# Patient Record
Sex: MALE | Race: WHITE | NOT HISPANIC OR LATINO | URBAN - METROPOLITAN AREA
[De-identification: names, ages, dates, MRNs, and addresses within clinical notes are randomized per-mention and may not be internally consistent; named-entity substitution may affect disease eponyms.]

---

## 2018-11-25 ENCOUNTER — INPATIENT (INPATIENT)
Facility: HOSPITAL | Age: 28
LOS: 10 days | Discharge: HOME | End: 2018-12-06
Attending: INTERNAL MEDICINE | Admitting: INTERNAL MEDICINE

## 2018-11-25 VITALS
TEMPERATURE: 98 F | OXYGEN SATURATION: 100 % | HEART RATE: 74 BPM | DIASTOLIC BLOOD PRESSURE: 56 MMHG | RESPIRATION RATE: 18 BRPM | SYSTOLIC BLOOD PRESSURE: 105 MMHG

## 2018-11-25 LAB
ALBUMIN SERPL ELPH-MCNC: 3.9 G/DL — SIGNIFICANT CHANGE UP (ref 3.5–5.2)
ALP SERPL-CCNC: 60 U/L — SIGNIFICANT CHANGE UP (ref 30–115)
ALT FLD-CCNC: 12 U/L — SIGNIFICANT CHANGE UP (ref 0–41)
ANION GAP SERPL CALC-SCNC: 12 MMOL/L — SIGNIFICANT CHANGE UP (ref 7–14)
APTT BLD: 36.1 SEC — SIGNIFICANT CHANGE UP (ref 27–39.2)
AST SERPL-CCNC: 17 U/L — SIGNIFICANT CHANGE UP (ref 0–41)
BASOPHILS # BLD AUTO: 0.02 K/UL — SIGNIFICANT CHANGE UP (ref 0–0.2)
BASOPHILS NFR BLD AUTO: 0.2 % — SIGNIFICANT CHANGE UP (ref 0–1)
BILIRUB SERPL-MCNC: 0.4 MG/DL — SIGNIFICANT CHANGE UP (ref 0.2–1.2)
BUN SERPL-MCNC: 16 MG/DL — SIGNIFICANT CHANGE UP (ref 10–20)
CALCIUM SERPL-MCNC: 9.4 MG/DL — SIGNIFICANT CHANGE UP (ref 8.5–10.1)
CHLORIDE SERPL-SCNC: 95 MMOL/L — LOW (ref 98–110)
CO2 SERPL-SCNC: 29 MMOL/L — SIGNIFICANT CHANGE UP (ref 17–32)
CREAT SERPL-MCNC: 1.1 MG/DL — SIGNIFICANT CHANGE UP (ref 0.7–1.5)
EOSINOPHIL # BLD AUTO: 0.03 K/UL — SIGNIFICANT CHANGE UP (ref 0–0.7)
EOSINOPHIL NFR BLD AUTO: 0.3 % — SIGNIFICANT CHANGE UP (ref 0–8)
GLUCOSE SERPL-MCNC: 98 MG/DL — SIGNIFICANT CHANGE UP (ref 70–99)
HCT VFR BLD CALC: 39.5 % — LOW (ref 42–52)
HGB BLD-MCNC: 13.1 G/DL — LOW (ref 14–18)
IMM GRANULOCYTES NFR BLD AUTO: 0.4 % — HIGH (ref 0.1–0.3)
INR BLD: 1.4 RATIO — HIGH (ref 0.65–1.3)
LYMPHOCYTES # BLD AUTO: 1.18 K/UL — LOW (ref 1.2–3.4)
LYMPHOCYTES # BLD AUTO: 10.4 % — LOW (ref 20.5–51.1)
MCHC RBC-ENTMCNC: 28.6 PG — SIGNIFICANT CHANGE UP (ref 27–31)
MCHC RBC-ENTMCNC: 33.2 G/DL — SIGNIFICANT CHANGE UP (ref 32–37)
MCV RBC AUTO: 86.2 FL — SIGNIFICANT CHANGE UP (ref 80–94)
MONOCYTES # BLD AUTO: 0.82 K/UL — HIGH (ref 0.1–0.6)
MONOCYTES NFR BLD AUTO: 7.3 % — SIGNIFICANT CHANGE UP (ref 1.7–9.3)
NEUTROPHILS # BLD AUTO: 9.2 K/UL — HIGH (ref 1.4–6.5)
NEUTROPHILS NFR BLD AUTO: 81.4 % — HIGH (ref 42.2–75.2)
NRBC # BLD: 0 /100 WBCS — SIGNIFICANT CHANGE UP (ref 0–0)
PLATELET # BLD AUTO: 197 K/UL — SIGNIFICANT CHANGE UP (ref 130–400)
POTASSIUM SERPL-MCNC: 4.7 MMOL/L — SIGNIFICANT CHANGE UP (ref 3.5–5)
POTASSIUM SERPL-SCNC: 4.7 MMOL/L — SIGNIFICANT CHANGE UP (ref 3.5–5)
PROT SERPL-MCNC: 7.3 G/DL — SIGNIFICANT CHANGE UP (ref 6–8)
PROTHROM AB SERPL-ACNC: 16.1 SEC — HIGH (ref 9.95–12.87)
RBC # BLD: 4.58 M/UL — LOW (ref 4.7–6.1)
RBC # FLD: 14.3 % — SIGNIFICANT CHANGE UP (ref 11.5–14.5)
SODIUM SERPL-SCNC: 136 MMOL/L — SIGNIFICANT CHANGE UP (ref 135–146)
WBC # BLD: 11.3 K/UL — HIGH (ref 4.8–10.8)
WBC # FLD AUTO: 11.3 K/UL — HIGH (ref 4.8–10.8)

## 2018-11-25 RX ORDER — PIPERACILLIN AND TAZOBACTAM 4; .5 G/20ML; G/20ML
4.5 INJECTION, POWDER, LYOPHILIZED, FOR SOLUTION INTRAVENOUS ONCE
Qty: 0 | Refills: 0 | Status: COMPLETED | OUTPATIENT
Start: 2018-11-25 | End: 2018-11-26

## 2018-11-25 RX ORDER — KETOROLAC TROMETHAMINE 30 MG/ML
15 SYRINGE (ML) INJECTION ONCE
Qty: 0 | Refills: 0 | Status: DISCONTINUED | OUTPATIENT
Start: 2018-11-25 | End: 2018-11-25

## 2018-11-25 RX ORDER — MORPHINE SULFATE 50 MG/1
6 CAPSULE, EXTENDED RELEASE ORAL ONCE
Qty: 0 | Refills: 0 | Status: DISCONTINUED | OUTPATIENT
Start: 2018-11-25 | End: 2018-11-25

## 2018-11-25 RX ORDER — VANCOMYCIN HCL 1 G
1000 VIAL (EA) INTRAVENOUS ONCE
Qty: 0 | Refills: 0 | Status: COMPLETED | OUTPATIENT
Start: 2018-11-25 | End: 2018-11-25

## 2018-11-25 RX ORDER — SODIUM CHLORIDE 9 MG/ML
1000 INJECTION INTRAMUSCULAR; INTRAVENOUS; SUBCUTANEOUS ONCE
Qty: 0 | Refills: 0 | Status: COMPLETED | OUTPATIENT
Start: 2018-11-25 | End: 2018-11-25

## 2018-11-25 RX ADMIN — SODIUM CHLORIDE 1000 MILLILITER(S): 9 INJECTION INTRAMUSCULAR; INTRAVENOUS; SUBCUTANEOUS at 23:03

## 2018-11-25 RX ADMIN — Medication 15 MILLIGRAM(S): at 23:14

## 2018-11-25 RX ADMIN — Medication 250 MILLIGRAM(S): at 23:29

## 2018-11-25 RX ADMIN — MORPHINE SULFATE 6 MILLIGRAM(S): 50 CAPSULE, EXTENDED RELEASE ORAL at 23:29

## 2018-11-25 NOTE — ED ADULT TRIAGE NOTE - CHIEF COMPLAINT QUOTE
pt c/o having an abscess to the groin area x 2 days. pt c/o having an abscess to the groin area x 2 days. +HIV

## 2018-11-25 NOTE — ED ADULT NURSE NOTE - NSIMPLEMENTINTERV_GEN_ALL_ED
Implemented All Universal Safety Interventions:  [place] to call system. Call bell, personal items and telephone within reach. Instruct patient to call for assistance. Room bathroom lighting operational. Non-slip footwear when patient is off stretcher. Physically safe environment: no spills, clutter or unnecessary equipment. Stretcher in lowest position, wheels locked, appropriate side rails in place.

## 2018-11-26 LAB
ANION GAP SERPL CALC-SCNC: 15 MMOL/L — HIGH (ref 7–14)
BASOPHILS # BLD AUTO: 0.02 K/UL — SIGNIFICANT CHANGE UP (ref 0–0.2)
BASOPHILS NFR BLD AUTO: 0.2 % — SIGNIFICANT CHANGE UP (ref 0–1)
BUN SERPL-MCNC: 15 MG/DL — SIGNIFICANT CHANGE UP (ref 10–20)
CALCIUM SERPL-MCNC: 8.4 MG/DL — LOW (ref 8.5–10.1)
CHLORIDE SERPL-SCNC: 97 MMOL/L — LOW (ref 98–110)
CO2 SERPL-SCNC: 24 MMOL/L — SIGNIFICANT CHANGE UP (ref 17–32)
CREAT SERPL-MCNC: 0.9 MG/DL — SIGNIFICANT CHANGE UP (ref 0.7–1.5)
EOSINOPHIL # BLD AUTO: 0.01 K/UL — SIGNIFICANT CHANGE UP (ref 0–0.7)
EOSINOPHIL NFR BLD AUTO: 0.1 % — SIGNIFICANT CHANGE UP (ref 0–8)
GLUCOSE SERPL-MCNC: 158 MG/DL — HIGH (ref 70–99)
HCT VFR BLD CALC: 36 % — LOW (ref 42–52)
HGB BLD-MCNC: 12.2 G/DL — LOW (ref 14–18)
IMM GRANULOCYTES NFR BLD AUTO: 0.4 % — HIGH (ref 0.1–0.3)
LACTATE SERPL-SCNC: 1.1 MMOL/L — SIGNIFICANT CHANGE UP (ref 0.5–2.2)
LACTATE SERPL-SCNC: 1.1 MMOL/L — SIGNIFICANT CHANGE UP (ref 0.5–2.2)
LYMPHOCYTES # BLD AUTO: 1.3 K/UL — SIGNIFICANT CHANGE UP (ref 1.2–3.4)
LYMPHOCYTES # BLD AUTO: 10.7 % — LOW (ref 20.5–51.1)
MAGNESIUM SERPL-MCNC: 1.9 MG/DL — SIGNIFICANT CHANGE UP (ref 1.8–2.4)
MCHC RBC-ENTMCNC: 28.9 PG — SIGNIFICANT CHANGE UP (ref 27–31)
MCHC RBC-ENTMCNC: 33.9 G/DL — SIGNIFICANT CHANGE UP (ref 32–37)
MCV RBC AUTO: 85.3 FL — SIGNIFICANT CHANGE UP (ref 80–94)
MONOCYTES # BLD AUTO: 0.83 K/UL — HIGH (ref 0.1–0.6)
MONOCYTES NFR BLD AUTO: 6.8 % — SIGNIFICANT CHANGE UP (ref 1.7–9.3)
NEUTROPHILS # BLD AUTO: 9.93 K/UL — HIGH (ref 1.4–6.5)
NEUTROPHILS NFR BLD AUTO: 81.8 % — HIGH (ref 42.2–75.2)
NRBC # BLD: 0 /100 WBCS — SIGNIFICANT CHANGE UP (ref 0–0)
PLATELET # BLD AUTO: 182 K/UL — SIGNIFICANT CHANGE UP (ref 130–400)
POTASSIUM SERPL-MCNC: 3.8 MMOL/L — SIGNIFICANT CHANGE UP (ref 3.5–5)
POTASSIUM SERPL-SCNC: 3.8 MMOL/L — SIGNIFICANT CHANGE UP (ref 3.5–5)
RBC # BLD: 4.22 M/UL — LOW (ref 4.7–6.1)
RBC # FLD: 14.3 % — SIGNIFICANT CHANGE UP (ref 11.5–14.5)
SODIUM SERPL-SCNC: 136 MMOL/L — SIGNIFICANT CHANGE UP (ref 135–146)
WBC # BLD: 12.14 K/UL — HIGH (ref 4.8–10.8)
WBC # FLD AUTO: 12.14 K/UL — HIGH (ref 4.8–10.8)

## 2018-11-26 RX ORDER — VANCOMYCIN HCL 1 G
1250 VIAL (EA) INTRAVENOUS EVERY 12 HOURS
Qty: 0 | Refills: 0 | Status: DISCONTINUED | OUTPATIENT
Start: 2018-11-26 | End: 2018-11-28

## 2018-11-26 RX ORDER — ENOXAPARIN SODIUM 100 MG/ML
40 INJECTION SUBCUTANEOUS DAILY
Qty: 0 | Refills: 0 | Status: DISCONTINUED | OUTPATIENT
Start: 2018-11-26 | End: 2018-11-28

## 2018-11-26 RX ORDER — OXYCODONE AND ACETAMINOPHEN 5; 325 MG/1; MG/1
1 TABLET ORAL EVERY 6 HOURS
Qty: 0 | Refills: 0 | Status: DISCONTINUED | OUTPATIENT
Start: 2018-11-26 | End: 2018-11-28

## 2018-11-26 RX ORDER — ACETAMINOPHEN 500 MG
650 TABLET ORAL EVERY 8 HOURS
Qty: 0 | Refills: 0 | Status: DISCONTINUED | OUTPATIENT
Start: 2018-11-26 | End: 2018-11-28

## 2018-11-26 RX ADMIN — Medication 166.67 MILLIGRAM(S): at 11:59

## 2018-11-26 RX ADMIN — Medication 166.67 MILLIGRAM(S): at 18:22

## 2018-11-26 RX ADMIN — PIPERACILLIN AND TAZOBACTAM 200 GRAM(S): 4; .5 INJECTION, POWDER, LYOPHILIZED, FOR SOLUTION INTRAVENOUS at 01:11

## 2018-11-26 RX ADMIN — ENOXAPARIN SODIUM 40 MILLIGRAM(S): 100 INJECTION SUBCUTANEOUS at 11:59

## 2018-11-26 RX ADMIN — MORPHINE SULFATE 6 MILLIGRAM(S): 50 CAPSULE, EXTENDED RELEASE ORAL at 04:20

## 2018-11-26 RX ADMIN — OXYCODONE AND ACETAMINOPHEN 1 TABLET(S): 5; 325 TABLET ORAL at 15:59

## 2018-11-26 RX ADMIN — Medication 15 MILLIGRAM(S): at 04:20

## 2018-11-26 RX ADMIN — Medication 650 MILLIGRAM(S): at 18:21

## 2018-11-26 RX ADMIN — Medication 650 MILLIGRAM(S): at 08:50

## 2018-11-26 NOTE — PROGRESS NOTE ADULT - SUBJECTIVE AND OBJECTIVE BOX
infectious diseases progress note:  WOODY KOHLI is a 28yMale patient    ABSCESS OF BUTTOCK        ROS:  EYES:  Negative  blurry vision or double vision  CARDIOVASCULAR:  Negative for chest pain or palpitations  RESPIRATORY:  Negative for cough, wheezing, or SOB   GASTROINTESTINAL:  Negative for nausea, vomiting, diarrhea, constipation, or abdominal pain  GENITOURINARY:  Negative frequency, urgency or dysuria  NEUROLOGIC:  No headache, confusion, dizziness, lightheadedness    Allergies    No Known Allergies    Intolerances        ANTIBIOTICS/RELEVANT:  antimicrobials  vancomycin  IVPB 1250 milliGRAM(s) IV Intermittent every 12 hours    immunologic:    OTHER:  acetaminophen   Tablet .. 650 milliGRAM(s) Oral every 8 hours PRN  enoxaparin Injectable 40 milliGRAM(s) SubCutaneous daily      Objective:  T(F): 98.4 (11-25-18 @ 21:18), Max: 98.4 (11-25-18 @ 21:18)  HR: 74 (11-25-18 @ 21:18) (74 - 74)  BP: 105/56 (11-25-18 @ 21:18) (105/56 - 105/56)  RR: 18 (11-25-18 @ 21:18) (18 - 18)  SpO2: 100% (11-25-18 @ 21:18) (100% - 100%)    PHYSICAL EXAM  Eyes:SHARATH, EOMI  Ear/Nose/Throat: no oral lesion, no sinus tenderness on percussion	  Neck:no JVD, no lymphadenopathy, supple  Respiratory: CTA dora  Cardiovascular: S1S2 RRR, no murmurs  Gastrointestinal:soft, (+) BS, no HSM  Extremities:no e/e/c    11-25    136  |  95<L>  |  16  ----------------------------<  98  4.7   |  29  |  1.1      TPro  7.3  /  Alb  3.9  /  TBili  0.4  /  DBili  x   /  AST  17  /  ALT  12  /  AlkPhos  60  11-25                            13.1   11.30 )-----------( 197      ( 25 Nov 2018 22:48 )             39.5       PT/INR - ( 25 Nov 2018 22:48 )   PT: 16.10 sec;   INR: 1.40 ratio         PTT - ( 25 Nov 2018 22:48 )  PTT:36.1 sec

## 2018-11-26 NOTE — H&P ADULT - HISTORY OF PRESENT ILLNESS
27 yo male hx of HIV non-compliant to medications, (last CD4 - 600 and viral load 150K 2 months ago) and recent MRSA to his finger which was treated in [place] present c/o right buttock abscess/redness/swelling and pain worsening in the past 2 days. + drainage at home. pain is worsen with movement and palpation. also reported generalized weakness. subjective fever and chill. otherwise denies HA/dizziness/chest pain/palpitation/sob/abd pain/n/v/d/ black stool/bloody stool/urinary sxs  patient also reported he hasn't taken any medications for his HIV in the past 2 years because he doesn't have insurance in [place] 27 yo M hx of HIV not on HAART due to insurance issue (last CD4 - 600 and viral load 150K 2 months ago as per patient) and recent MRSA to his finger 3 months ago which was treated in [place] and hx of Syphilis presents to ED for right buttock abscess/redness/swelling and pain worsening in the past 2 days. Also reports subjective fever and chills with generalized weakness. Patient reports that he was recently admitted to hospital in [place] and treated with Vanco and discharged home on 30 days course of Doxycycline. However, patient only finished 20 days of doxycycline due to upset stomach.  Denied headache, rhinorrhea, sore throat, oral thrush, dysphagia, odynophagia, abdominal pain, change in bowel movement, change in urination.       Patient also reported he hasn't been taken any medications for his HIV in the past 2-3 years because he doesn't have insurance in [place]. Also reports that he was dx with Syphilis but has been receiving treatment with Penicillin IM in the past, however missed last dose.

## 2018-11-26 NOTE — ED PROVIDER NOTE - PHYSICAL EXAMINATION
CONSTITUTIONAL: Well-appearing; well-nourished; in no apparent distress.   EYES: PERRL; EOM intact.   CARDIOVASCULAR: Normal S1, S2; no murmurs, rubs, or gallops.   RESPIRATORY: Normal chest excursion with respiration; breath sounds clear and equal bilaterally; no wheezes, rhonchi, or rales.  GI/: Normal bowel sounds; non-distended; non-tender; no palpable organomegaly.   MS: No evidence of trauma or deformity. Non-tender to palpation. Normal ROM in all four extremities; non-tender to palpation; distal pulses are normal.   SKIN: + large ~6 cm abscess noted to right lower buttock with surrounding erythema/induration and tenderness. + softness and fluctuant to abscess.   NEURO/PSYCH: A & O x 4; grossly unremarkable. mood and manner are appropriate. CONSTITUTIONAL: Well-appearing; well-nourished; in no apparent distress.   EYES: PERRL; EOM intact.   CARDIOVASCULAR: Normal S1, S2; no murmurs, rubs, or gallops.   RESPIRATORY: Normal chest excursion with respiration; breath sounds clear and equal bilaterally; no wheezes, rhonchi, or rales.  GI/: Normal bowel sounds; non-distended; non-tender; no palpable organomegaly.   MS: No evidence of trauma or deformity. Non-tender to palpation. Normal ROM in all four extremities; non-tender to palpation; distal pulses are normal.   SKIN: + large ~6 cm abscess noted to right lower buttock with surrounding erythema/induration and tenderness. + softness and fluctuant to abscess. buttock abscess without extension to anus  NEURO/PSYCH: A & O x 4; grossly unremarkable. mood and manner are appropriate.

## 2018-11-26 NOTE — ED ADULT NURSE REASSESSMENT NOTE - NS ED NURSE REASSESS COMMENT FT1
Patient resting in bed. All due care rendered. All medication given as ordered. IV patent and intact. Patient admitted for right buttock abscess awaiting bed. No acute distress. Will continue to monitor.

## 2018-11-26 NOTE — H&P ADULT - NSHPREVIEWOFSYSTEMS_GEN_ALL_CORE
REVIEW OF SYSTEMS:    CONSTITUTIONAL: No weakness, fevers or chills  EYES/ENT: No visual changes;  No vertigo or throat pain   NECK: No pain or stiffness  RESPIRATORY: No cough, wheezing, hemoptysis; No shortness of breath  CARDIOVASCULAR: No chest pain or palpitations  GASTROINTESTINAL: No abdominal or epigastric pain. No nausea, vomiting, or hematemesis; No diarrhea or constipation. No melena or hematochezia.  GENITOURINARY: No dysuria, frequency or hematuria  NEUROLOGICAL: No numbness or weakness  SKIN: No itching, rashes REVIEW OF SYSTEMS:    CONSTITUTIONAL: No weakness. + subjective fever. + chills.   EYES/ENT: No visual changes;  No vertigo or throat pain.   NECK: No pain or stiffness.   RESPIRATORY: No cough, wheezing, hemoptysis; No shortness of breath  CARDIOVASCULAR: No chest pain or palpitations.   GASTROINTESTINAL: No abdominal or epigastric pain. No nausea, vomiting, or hematemesis; No diarrhea or constipation. No melena or hematochezia.  GENITOURINARY: No dysuria, frequency or hematuria. + Right buttock pain.   NEUROLOGICAL: No numbness or weakness.   SKIN: No itching, rashes.

## 2018-11-26 NOTE — H&P ADULT - ASSESSMENT
Right Buttock abscess  - s/p I & D at bedside with purulent drainage.  - c/w Vanco and Kj   - f/u Wound Cx, BCx  - pain control   - ID consulted.     HIV  - will check CD4 count and HIV viral load  - not on HAART for the past 2 years due to insurance issue.       DVT ppx: Lovenox SC  GI ppx: not indicated.   Diet: Regular diet  Activity: ambulates as tolerated. 29 yo M hx of HIV not on HAART due to insurance issue (last CD4 - 600 and viral load 150K 2 months ago as per patient) and recent MRSA to his finger 3 months ago which was treated in [place], hx of syphilis presents to ED for right buttock abscess/redness/swelling and pain worsening in the past 2 days.      Right Buttock abscess  - s/p I & D at bedside with purulent drainage.  - c/w Vanco (check Vanc trough after 3rd dose)  - f/u Wound Cx, BCx  - pain control   - ID consulted.     HIV  - not on HAART for the past 2-3 years due to insurance issue.   - will check CD4 count and HIV viral load  - f/u ID     Hx of Syphilis  - patient reports that he was receiving penicillin IM but has missed the last dose.   - f/u RPR, VDRL titer  - f/u ID      DVT ppx: Lovenox SC  GI ppx: not indicated.   Diet: Regular diet  Activity: ambulates as tolerated.

## 2018-11-26 NOTE — H&P ADULT - NSHPPHYSICALEXAM_GEN_ALL_CORE
PHYSICAL EXAM:  GEN: No acute distress  HEENT:   LUNGS: Clear to auscultation bilaterally   HEART: S1/S2 present. RRR.   ABD: Soft, non-tender, non-distended. Bowel sounds present  EXT:  NEURO: AAOX3 PHYSICAL EXAM:  GEN: No acute distress.   HEENT: EOMI. Pupils are equal and reactive to light.   LUNGS: Clear to auscultation bilaterally. No wheeze/rales/crackles.   HEART: S1/S2 present. RRR. No murmur/gallops/rubs.   ABD: Soft, non-tender, non-distended. Bowel sounds present.   EXT: No pitting edema.   NEURO: AAOX3. Non focal.

## 2018-11-26 NOTE — ED PROVIDER NOTE - CARE PLAN
Principal Discharge DX:	Abscess of buttock, right  Secondary Diagnosis:	Cellulitis of buttock, right  Secondary Diagnosis:	HIV (human immunodeficiency virus infection)

## 2018-11-26 NOTE — ED PROVIDER NOTE - ATTENDING CONTRIBUTION TO CARE
29 yo male h/o HIV not on any meds, currently undomiciled ( says he moved from [place] to  and is staying with his cousin, but now the only place he can go is [facility]) c/o pain /swelling and drainage from abscess which  he noticed 2 days ago.   Denies, abdominal pain, urinary complaints, no tenesmus. Young male in NAD, + large area of erythema, induration and ttp located on the rt buttock near his perineum, + fluctuance without active drainage, no perianal or rectal involvement, no scrotal lesions, rest of exam unremarkable.   Bedside sono shows collection at the point of max fluctuance, will perform I&D, start abx and admit.

## 2018-11-26 NOTE — H&P ADULT - PMH
HIV (human immunodeficiency virus infection) HIV (human immunodeficiency virus infection)    Syphilis

## 2018-11-26 NOTE — H&P ADULT - NSHPSOCIALHISTORY_GEN_ALL_CORE
Alcohol -   Smoke -   Illicit drug - Alcohol - Denied.   Smoke - 1/2 ppd.   Illicit drug - Marijuana

## 2018-11-26 NOTE — ED PROVIDER NOTE - OBJECTIVE STATEMENT
29 yo male hx of HIV non-compliant to medications, (last CD4 - 600 and viral load 150K 2 months ago) and recent MRSA to his finger which was treated in [place] present c/o right buttock abscess/redness/swelling and pain worsening in the past 2 days. + drainage at home. pain is worsen with movement and palpation. also reported generalized weakness. subjective fever and chill. otherwise denies HA/dizziness/chest pain/palpitation/sob/abd pain/n/v/d/ black stool/bloody stool/urinary sxs  patient also reported he hasn't taken any medications for his HIV in the past 2 years because he doesn't have insurance in [place].

## 2018-11-26 NOTE — H&P ADULT - NSHPLABSRESULTS_GEN_ALL_CORE
13.1   11.30 )-----------( 197      ( 25 Nov 2018 22:48 )             39.5             11-25    136  |  95<L>  |  16  ----------------------------<  98  4.7   |  29  |  1.1    Ca    9.4      25 Nov 2018 22:48    TPro  7.3  /  Alb  3.9  /  TBili  0.4  /  DBili  x   /  AST  17  /  ALT  12  /  AlkPhos  60  11-25    LIVER FUNCTIONS - ( 25 Nov 2018 22:48 )  Alb: 3.9 g/dL / Pro: 7.3 g/dL / ALK PHOS: 60 U/L / ALT: 12 U/L / AST: 17 U/L / GGT: x                 PT/INR - ( 25 Nov 2018 22:48 )   PT: 16.10 sec;   INR: 1.40 ratio         PTT - ( 25 Nov 2018 22:48 )  PTT:36.1 sec 13.1   11.30 )-----------( 197      ( 25 Nov 2018 22:48 )             39.5             11-25      136  |  95<L>  |  16  ----------------------------<  98  4.7   |  29  |  1.1    Ca    9.4      25 Nov 2018 22:48    TPro  7.3  /  Alb  3.9  /  TBili  0.4  /  DBili  x   /  AST  17  /  ALT  12  /  AlkPhos  60  11-25    LIVER FUNCTIONS - ( 25 Nov 2018 22:48 )  Alb: 3.9 g/dL / Pro: 7.3 g/dL / ALK PHOS: 60 U/L / ALT: 12 U/L / AST: 17 U/L / GGT: x         PT/INR - ( 25 Nov 2018 22:48 )   PT: 16.10 sec;   INR: 1.40 ratio         PTT - ( 25 Nov 2018 22:48 )  PTT:36.1 sec

## 2018-11-26 NOTE — PROGRESS NOTE ADULT - ASSESSMENT
27 yo M hx of asymptomatic HIV (last CD4 - 600 and viral load 150K 2 months ago as per patient)   Pt not on HAART due to insurance issue   Pt with recent MRSA to his finger 3 months ago which was treated in [place] with IV Vanco & PO Doxycycline  Pt with  hx of syphilis    Pt presents for right buttock abscess/redness/swelling and pain worsening in the past 2 days.      Right Buttock abscess  - s/p I & D at bedside with purulent drainage.  - c/w Vanco (check Vanc trough after 3rd dose)  - f/u Wound Cx, BCx  - pain control     HIV  - not on HAART for the past 2-3 years due to insurance issue.   - will check CD4 count and HIV viral load    Hx of Syphilis  - patient reports that he was receiving penicillin IM but has missed the last dose.   - f/u RPR titer    DVT ppx: Lovenox SC  GI ppx: not indicated.   Diet: Regular diet  Activity: ambulates as tolerated.     On vancomycin  IVPB 1250 milliGRAM(s) IV Intermittent every 12 hours for buttock abscess    Tmax 98.4 with wbc 11.3

## 2018-11-26 NOTE — ED PROVIDER NOTE - NS ED ROS FT
Constitutional: + fever, chills, change in appetite and malaise. no recent weight loss,   Eyes: no redness/discharge/pain/vision changes  ENT: no rhinorrhea/ear pain/sore throat  Cardiac: No chest pain, SOB or edema.  Respiratory: No cough or respiratory distress  GI: No nausea, vomiting, diarrhea or abdominal pain.  : No dysuria, frequency, urgency or hematuria  MS: no pain to back or extremities, no loss of ROM, no weakness  Neuro: No headache or weakness. No LOC.  Skin: see HPI  Endocrine: No history of thyroid disease or diabetes.  Except as documented in the HPI, all other systems are negative.

## 2018-11-27 LAB
4/8 RATIO: 0.5 RATIO — LOW (ref 1.2–3.4)
ABS CD8: 754 /UL — HIGH (ref 206–494)
ANION GAP SERPL CALC-SCNC: 12 MMOL/L — SIGNIFICANT CHANGE UP (ref 7–14)
BASOPHILS # BLD AUTO: 0.01 K/UL — SIGNIFICANT CHANGE UP (ref 0–0.2)
BASOPHILS NFR BLD AUTO: 0.1 % — SIGNIFICANT CHANGE UP (ref 0–1)
BUN SERPL-MCNC: 11 MG/DL — SIGNIFICANT CHANGE UP (ref 10–20)
CALCIUM SERPL-MCNC: 8.7 MG/DL — SIGNIFICANT CHANGE UP (ref 8.5–10.1)
CD16+CD56+ CELLS NFR BLD: 4 % — SIGNIFICANT CHANGE UP (ref 4–20)
CD16+CD56+ CELLS NFR SPEC: 58 /UL — LOW (ref 89–385)
CD19 BLASTS SPEC-ACNC: 7 % — LOW (ref 10–28)
CD19 BLASTS SPEC-ACNC: 98 /UL — SIGNIFICANT CHANGE UP (ref 72–502)
CD3 BLASTS SPEC-ACNC: 1145 /UL — SIGNIFICANT CHANGE UP (ref 800–2012)
CD3 BLASTS SPEC-ACNC: 88 % — HIGH (ref 59–81)
CD4 %: 29 % — LOW (ref 42–58)
CD8 %: 58 % — HIGH (ref 14–30)
CHLORIDE SERPL-SCNC: 101 MMOL/L — SIGNIFICANT CHANGE UP (ref 98–110)
CO2 SERPL-SCNC: 27 MMOL/L — SIGNIFICANT CHANGE UP (ref 17–32)
CREAT SERPL-MCNC: 0.8 MG/DL — SIGNIFICANT CHANGE UP (ref 0.7–1.5)
EOSINOPHIL # BLD AUTO: 0.1 K/UL — SIGNIFICANT CHANGE UP (ref 0–0.7)
EOSINOPHIL NFR BLD AUTO: 0.9 % — SIGNIFICANT CHANGE UP (ref 0–8)
GLUCOSE SERPL-MCNC: 91 MG/DL — SIGNIFICANT CHANGE UP (ref 70–99)
HCT VFR BLD CALC: 41.5 % — LOW (ref 42–52)
HGB BLD-MCNC: 13.7 G/DL — LOW (ref 14–18)
IMM GRANULOCYTES NFR BLD AUTO: 0.6 % — HIGH (ref 0.1–0.3)
LYMPHOCYTES # BLD AUTO: 1.66 K/UL — SIGNIFICANT CHANGE UP (ref 1.2–3.4)
LYMPHOCYTES # BLD AUTO: 15.5 % — LOW (ref 20.5–51.1)
MCHC RBC-ENTMCNC: 28.8 PG — SIGNIFICANT CHANGE UP (ref 27–31)
MCHC RBC-ENTMCNC: 33 G/DL — SIGNIFICANT CHANGE UP (ref 32–37)
MCV RBC AUTO: 87.4 FL — SIGNIFICANT CHANGE UP (ref 80–94)
MONOCYTES # BLD AUTO: 0.82 K/UL — HIGH (ref 0.1–0.6)
MONOCYTES NFR BLD AUTO: 7.7 % — SIGNIFICANT CHANGE UP (ref 1.7–9.3)
NEUTROPHILS # BLD AUTO: 8.05 K/UL — HIGH (ref 1.4–6.5)
NEUTROPHILS NFR BLD AUTO: 75.2 % — SIGNIFICANT CHANGE UP (ref 42.2–75.2)
PLATELET # BLD AUTO: 202 K/UL — SIGNIFICANT CHANGE UP (ref 130–400)
POTASSIUM SERPL-MCNC: 4.7 MMOL/L — SIGNIFICANT CHANGE UP (ref 3.5–5)
POTASSIUM SERPL-SCNC: 4.7 MMOL/L — SIGNIFICANT CHANGE UP (ref 3.5–5)
RBC # BLD: 4.75 M/UL — SIGNIFICANT CHANGE UP (ref 4.7–6.1)
RBC # FLD: 14.3 % — SIGNIFICANT CHANGE UP (ref 11.5–14.5)
SODIUM SERPL-SCNC: 140 MMOL/L — SIGNIFICANT CHANGE UP (ref 135–146)
T-CELL CD4 SUBSET PNL BLD: 375 /UL — LOW (ref 495–1312)
WBC # BLD: 10.7 K/UL — SIGNIFICANT CHANGE UP (ref 4.8–10.8)
WBC # FLD AUTO: 10.7 K/UL — SIGNIFICANT CHANGE UP (ref 4.8–10.8)

## 2018-11-27 RX ORDER — MEROPENEM 1 G/30ML
1000 INJECTION INTRAVENOUS ONCE
Qty: 0 | Refills: 0 | Status: COMPLETED | OUTPATIENT
Start: 2018-11-27 | End: 2018-11-27

## 2018-11-27 RX ORDER — IBUPROFEN 200 MG
400 TABLET ORAL ONCE
Qty: 0 | Refills: 0 | Status: COMPLETED | OUTPATIENT
Start: 2018-11-27 | End: 2018-11-27

## 2018-11-27 RX ORDER — CEFTRIAXONE 500 MG/1
INJECTION, POWDER, FOR SOLUTION INTRAMUSCULAR; INTRAVENOUS
Qty: 0 | Refills: 0 | Status: DISCONTINUED | OUTPATIENT
Start: 2018-11-27 | End: 2018-11-27

## 2018-11-27 RX ORDER — OXYCODONE AND ACETAMINOPHEN 5; 325 MG/1; MG/1
1 TABLET ORAL ONCE
Qty: 0 | Refills: 0 | Status: DISCONTINUED | OUTPATIENT
Start: 2018-11-27 | End: 2018-11-27

## 2018-11-27 RX ORDER — MEROPENEM 1 G/30ML
INJECTION INTRAVENOUS
Qty: 0 | Refills: 0 | Status: DISCONTINUED | OUTPATIENT
Start: 2018-11-27 | End: 2018-11-27

## 2018-11-27 RX ORDER — MEROPENEM 1 G/30ML
1000 INJECTION INTRAVENOUS EVERY 8 HOURS
Qty: 0 | Refills: 0 | Status: DISCONTINUED | OUTPATIENT
Start: 2018-11-27 | End: 2018-11-27

## 2018-11-27 RX ORDER — CEFTRIAXONE 500 MG/1
1 INJECTION, POWDER, FOR SOLUTION INTRAMUSCULAR; INTRAVENOUS ONCE
Qty: 0 | Refills: 0 | Status: DISCONTINUED | OUTPATIENT
Start: 2018-11-27 | End: 2018-11-27

## 2018-11-27 RX ORDER — INFLUENZA VIRUS VACCINE 15; 15; 15; 15 UG/.5ML; UG/.5ML; UG/.5ML; UG/.5ML
0.5 SUSPENSION INTRAMUSCULAR ONCE
Qty: 0 | Refills: 0 | Status: DISCONTINUED | OUTPATIENT
Start: 2018-11-27 | End: 2018-12-06

## 2018-11-27 RX ORDER — PIPERACILLIN AND TAZOBACTAM 4; .5 G/20ML; G/20ML
3.38 INJECTION, POWDER, LYOPHILIZED, FOR SOLUTION INTRAVENOUS EVERY 6 HOURS
Qty: 0 | Refills: 0 | Status: DISCONTINUED | OUTPATIENT
Start: 2018-11-27 | End: 2018-11-28

## 2018-11-27 RX ADMIN — PIPERACILLIN AND TAZOBACTAM 100 GRAM(S): 4; .5 INJECTION, POWDER, LYOPHILIZED, FOR SOLUTION INTRAVENOUS at 18:31

## 2018-11-27 RX ADMIN — Medication 166.67 MILLIGRAM(S): at 06:54

## 2018-11-27 RX ADMIN — Medication 400 MILLIGRAM(S): at 00:47

## 2018-11-27 RX ADMIN — Medication 100 MILLIGRAM(S): at 16:22

## 2018-11-27 RX ADMIN — MEROPENEM 100 MILLIGRAM(S): 1 INJECTION INTRAVENOUS at 06:55

## 2018-11-27 RX ADMIN — Medication 100 MILLIGRAM(S): at 06:12

## 2018-11-27 RX ADMIN — OXYCODONE AND ACETAMINOPHEN 1 TABLET(S): 5; 325 TABLET ORAL at 10:22

## 2018-11-27 RX ADMIN — Medication 400 MILLIGRAM(S): at 02:34

## 2018-11-27 RX ADMIN — MEROPENEM 100 MILLIGRAM(S): 1 INJECTION INTRAVENOUS at 00:47

## 2018-11-27 RX ADMIN — OXYCODONE AND ACETAMINOPHEN 1 TABLET(S): 5; 325 TABLET ORAL at 14:07

## 2018-11-27 RX ADMIN — OXYCODONE AND ACETAMINOPHEN 1 TABLET(S): 5; 325 TABLET ORAL at 00:48

## 2018-11-27 RX ADMIN — OXYCODONE AND ACETAMINOPHEN 1 TABLET(S): 5; 325 TABLET ORAL at 22:30

## 2018-11-27 RX ADMIN — Medication 100 MILLIGRAM(S): at 22:00

## 2018-11-27 RX ADMIN — OXYCODONE AND ACETAMINOPHEN 1 TABLET(S): 5; 325 TABLET ORAL at 21:58

## 2018-11-27 RX ADMIN — Medication 166.67 MILLIGRAM(S): at 21:58

## 2018-11-27 RX ADMIN — OXYCODONE AND ACETAMINOPHEN 1 TABLET(S): 5; 325 TABLET ORAL at 16:09

## 2018-11-27 RX ADMIN — OXYCODONE AND ACETAMINOPHEN 1 TABLET(S): 5; 325 TABLET ORAL at 02:34

## 2018-11-27 NOTE — PATIENT PROFILE ADULT - VISION (WITH CORRECTIVE LENSES IF THE PATIENT USUALLY WEARS THEM):
Normal vision: sees adequately in most situations; can see medication labels, newsprint/no glasses. requires for distance per pt

## 2018-11-27 NOTE — PROGRESS NOTE ADULT - SUBJECTIVE AND OBJECTIVE BOX
WOODY KOHLI  28y, Male      OVERNIGHT EVENTS:  febrile 101/7  reports erythema is extending to scrotum    ROS negative except as per above    VITALS:  T(F): 96.8, Max: 101.7 (11-26-18 @ 23:52)  HR: 70  BP: 109/63  RR: 18Vital Signs Last 24 Hrs  T(C): 36 (27 Nov 2018 05:21), Max: 38.7 (26 Nov 2018 23:52)  T(F): 96.8 (27 Nov 2018 05:21), Max: 101.7 (26 Nov 2018 23:52)  HR: 70 (27 Nov 2018 05:21) (70 - 109)  BP: 109/63 (27 Nov 2018 06:40) (96/58 - 119/76)  BP(mean): --  RR: 18 (27 Nov 2018 05:21) (18 - 18)  SpO2: 97% (27 Nov 2018 02:13) (97% - 99%)    PHYSICAL EXAM  Gen: Awake and alert, non-toxic appearing, NAD  HEENT: NCAT. EOMI. MMM. no thrush  Neck: Supple, no cervical LAD  CV: RRR, no murmurs  Lungs: CTAB, no w/r/r  Abd: Soft. NTND  : L gluteal perineal abscess s/p I&D, induration, erythema extending to scrotum TTP  Extr: wwp, no edema  Skin: no rash  Neuro: No focal deficits  Lines: clean      TESTS & MEASUREMENTS:                        13.7   10.70 )-----------( 202      ( 27 Nov 2018 09:13 )             41.5     11-27    140  |  101  |  11  ----------------------------<  91  4.7   |  27  |  0.8    Ca    8.7      27 Nov 2018 09:13  Mg     1.9     11-26    TPro  7.3  /  Alb  3.9  /  TBili  0.4  /  DBili  x   /  AST  17  /  ALT  12  /  AlkPhos  60  11-25    LIVER FUNCTIONS - ( 25 Nov 2018 22:48 )  Alb: 3.9 g/dL / Pro: 7.3 g/dL / ALK PHOS: 60 U/L / ALT: 12 U/L / AST: 17 U/L / GGT: x             Culture - Blood (collected 11-25-18 @ 22:55)  Source: .Blood Blood  Preliminary Report (11-27-18 @ 09:01):    No growth to date.          RADIOLOGY & ADDITIONAL TESTS:    ANTIBIOTICS:    clindamycin IVPB   100 mL/Hr IV Intermittent (11-27-18 @ 06:12)    meropenem  IVPB   100 mL/Hr IV Intermittent (11-27-18 @ 00:47)    meropenem  IVPB   100 mL/Hr IV Intermittent (11-27-18 @ 06:55)    piperacillin/tazobactam IVPB.   200 mL/Hr IV Intermittent (11-26-18 @ 01:11)    vancomycin  IVPB   250 mL/Hr IV Intermittent (11-25-18 @ 23:29)    vancomycin  IVPB   166.67 mL/Hr IV Intermittent (11-27-18 @ 06:54)   166.67 mL/Hr IV Intermittent (11-26-18 @ 18:22)   166.67 mL/Hr IV Intermittent (11-26-18 @ 11:59)        cefTRIAXone   IVPB      cefTRIAXone   IVPB 1 Gram(s) IV Intermittent once  vancomycin  IVPB 1250 milliGRAM(s) IV Intermittent every 12 hours

## 2018-11-27 NOTE — PROGRESS NOTE ADULT - ASSESSMENT
27 yo M hx of HIV not on HAART due to insurance issue (last CD4 - 600 and viral load 150K 2 months ago as per patient) and recent MRSA to his finger 3 months ago which was treated in [place], hx of syphilis presents to ED for right buttock abscess/redness/swelling and pain worsening in the past 2 days.      Right Buttock/groin abscess  - s/p I & D at bedside with purulent drainage.  - c/w Vanco, doxycycline and zosyn for now as per ID  - concerning for necrotizing fascitis will get CT scan of abdomen and pelvis with I/V con urgent today  - consult placed for burn unit  - f/u Wound Cx, BCx  - pain control   - ID following    HIV  - not on HAART for the past 2-3 years due to insurance issue.   - will check CD4 count and HIV viral load  - ID following    Hx of Syphilis  - patient reports that he was receiving penicillin IM but has missed the last dose.   - f/u RPR, VDRL titer  - ID on board      DVT ppx: Lovenox SC  GI ppx: not indicated.   Diet: Regular diet  Activity: ambulates as tolerated.

## 2018-11-27 NOTE — CONSULT NOTE ADULT - SUBJECTIVE AND OBJECTIVE BOX
28y  Male  HPI:  29 yo M with PMHx of HIV not on HAART due to insurance issue (last CD4 - 600 and viral load 150K 2 months ago as per patient) and recent MRSA to his finger 3 months ago which was treated in [place] and hx of Syphilis presents to ED for right buttock abscess/redness/swelling and pain worsening in the past 2 days. Also reports subjective fever and chills with generalized weakness. Patient reports that he was recently admitted to hospital in [place] and treated with Vanco and discharged home on 30 days course of Doxycycline. However, patient only finished 20 days of doxycycline due to upset stomach.  Denied headache, rhinorrhea, sore throat, oral thrush, dysphagia, odynophagia, abdominal pain, change in bowel movement, change in urination.       Patient also reported he hasn't been taken any medications for his HIV in the past 2-3 years because he doesn't have insurance in [place]. Also reports that he was dx with Syphilis but has been receiving treatment with Penicillin IM in the past, however missed last dose. (26 Nov 2018 00:34)    Pt s/p I/D of abscess in ED.  Pt currently on Vanc 1.25 q12h, Clinda 900mg q8h IV, zosyn 3.375 q6h as per ID recommendations  CT abdom/pelv pending    Allergies  Haldol (Other)    Intolerances    PAST MEDICAL & SURGICAL HISTORY:  Syphilis  HIV (human immunodeficiency virus infection)  No significant past surgical history    LABS:    CBC Full  -  ( 27 Nov 2018 09:13 )  WBC Count : 10.70 K/uL  Hemoglobin : 13.7 g/dL  Hematocrit : 41.5 %  Platelet Count - Automated : 202 K/uL  Mean Cell Volume : 87.4 fL  Mean Cell Hemoglobin : 28.8 pg  Mean Cell Hemoglobin Concentration : 33.0 g/dL  Auto Neutrophil # : 8.05 K/uL  Auto Lymphocyte # : 1.66 K/uL  Auto Monocyte # : 0.82 K/uL  Auto Eosinophil # : 0.10 K/uL  Auto Basophil # : 0.01 K/uL  Auto Neutrophil % : 75.2 %  Auto Lymphocyte % : 15.5 %  Auto Monocyte % : 7.7 %  Auto Eosinophil % : 0.9 %  Auto Basophil % : 0.1 %    11-27    140  |  101  |  11  ----------------------------<  91  4.7   |  27  |  0.8    Ca    8.7      27 Nov 2018 09:13  Mg     1.9     11-26    TPro  7.3  /  Alb  3.9  /  TBili  0.4  /  DBili  x   /  AST  17  /  ALT  12  /  AlkPhos  60  11-25    PT/INR - ( 25 Nov 2018 22:48 )   PT: 16.10 sec;   INR: 1.40 ratio      PTT - ( 25 Nov 2018 22:48 )  PTT:36.1 sec    CT abdom/pelv pending    Ass/ Rec: Pt is 28 with PMHx of HIV not on HAART due to insurance issue (CD4 - 600 and viral load 150K), hx of Syphilis, recent MRSA to his finger 3 months ago-treated w/ Vanco ans Doxy in [place], now presented with right groin/buttock abscess, cellulitis, ?necrotising fascitis, started on Vanco, Clinda, Zosyn, s/p I/D in ED.      - NPO post-midnight for possible surgical debridement 11/28  - continue abx as per ID recommendations  - Labs, Type and Screen, ECG, CXR ordered  - WCx and BCx pending  - CT abdom/plev pending  - will follow

## 2018-11-27 NOTE — PROGRESS NOTE ADULT - SUBJECTIVE AND OBJECTIVE BOX
WOODY KOHLI 28y Male  MRN#: [id number]   CODE STATUS: FULL      SUBJECTIVE  Patient is a 28y old Male who presents with a chief complaint of Right buttock abscess (27 Nov 2018 13:09)  Currently admitted to medicine with the primary diagnosis of Abscess of buttock, right  Hospital course has been complicated by episodes of pain in the site of abscess and spiking fevers.  Today is hospital day 1d, and this morning he is complaining of pain in his right groin region and reports spiking fever overnight.       OBJECTIVE  PAST MEDICAL & SURGICAL HISTORY  Syphilis  HIV (human immunodeficiency virus infection)  No significant past surgical history    ALLERGIES:  Haldol (Other)    MEDICATIONS:  STANDING MEDICATIONS  clindamycin IVPB 900 milliGRAM(s) IV Intermittent every 8 hours  enoxaparin Injectable 40 milliGRAM(s) SubCutaneous daily  influenza   Vaccine 0.5 milliLiter(s) IntraMuscular once  piperacillin/tazobactam IVPB. 3.375 Gram(s) IV Intermittent every 6 hours  vancomycin  IVPB 1250 milliGRAM(s) IV Intermittent every 12 hours    PRN MEDICATIONS  acetaminophen   Tablet .. 650 milliGRAM(s) Oral every 8 hours PRN  oxyCODONE    5 mG/acetaminophen 325 mG 1 Tablet(s) Oral every 6 hours PRN      VITAL SIGNS: Last 24 Hours  T(C): 36.9 (27 Nov 2018 14:09), Max: 38.7 (26 Nov 2018 23:52)  T(F): 98.5 (27 Nov 2018 14:09), Max: 101.7 (26 Nov 2018 23:52)  HR: 97 (27 Nov 2018 14:09) (70 - 109)  BP: 156/65 (27 Nov 2018 14:09) (96/58 - 156/65)  BP(mean): --  RR: 18 (27 Nov 2018 14:09) (18 - 18)  SpO2: 97% (27 Nov 2018 02:13) (97% - 99%)    LABS:                        13.7   10.70 )-----------( 202      ( 27 Nov 2018 09:13 )             41.5     11-27    140  |  101  |  11  ----------------------------<  91  4.7   |  27  |  0.8    Ca    8.7      27 Nov 2018 09:13  Mg     1.9     11-26    TPro  7.3  /  Alb  3.9  /  TBili  0.4  /  DBili  x   /  AST  17  /  ALT  12  /  AlkPhos  60  11-25    PT/INR - ( 25 Nov 2018 22:48 )   PT: 16.10 sec;   INR: 1.40 ratio         PTT - ( 25 Nov 2018 22:48 )  PTT:36.1 sec          Culture - Blood (collected 25 Nov 2018 22:55)  Source: .Blood Blood  Preliminary Report (27 Nov 2018 09:01):    No growth to date.          RADIOLOGY:      PHYSICAL EXAM:    GENERAL: NAD, well-developed, AAOx3  HEENT:  Atraumatic, Normocephalic. EOMI, PERRLA, conjunctiva and sclera clear, No JVD  PULMONARY: Clear to auscultation bilaterally; No wheeze  CARDIOVASCULAR: Regular rate and rhythm; No murmurs, rubs, or gallops  GASTROINTESTINAL: Soft, Nontender, Nondistended; Bowel sounds present  MUSCULOSKELETAL:  2+ Peripheral Pulses, No clubbing, cyanosis, or edema  NEUROLOGY: non-focal  SKIN: cellulitis/abscess right groin region

## 2018-11-27 NOTE — PROGRESS NOTE ADULT - ASSESSMENT
28M    Asymptomatic HIV  Recent MRSA skin infection of the finger    Admitted with L groin/cellulitis/abscess  Continued fevers  s/p I&D pending cx    Concerning for fascitis as cellulitis extending, severe TTP on exam    #Abscess  - Burn consult  - Vanc 1.25 q12h  - Clinda 900mg q8h IV  - Add zosyn 3.375 q6h  - CT AP IV to r/o deep collection  - f/u cultures    #HIV  - Previously on Genvoya, off for 2 years  - Dx 2016 while in skilled nursing  - Needs insurance, housing  - If insurance confirmed, restart Genvoya  - f/u CD4, VL  - Send quantiferon gold

## 2018-11-28 LAB
-  AMPICILLIN/SULBACTAM: SIGNIFICANT CHANGE UP
-  CEFAZOLIN: SIGNIFICANT CHANGE UP
-  CLINDAMYCIN: SIGNIFICANT CHANGE UP
-  DAPTOMYCIN: SIGNIFICANT CHANGE UP
-  ERYTHROMYCIN: SIGNIFICANT CHANGE UP
-  GENTAMICIN: SIGNIFICANT CHANGE UP
-  LINEZOLID: SIGNIFICANT CHANGE UP
-  OXACILLIN: SIGNIFICANT CHANGE UP
-  PENICILLIN: SIGNIFICANT CHANGE UP
-  RIFAMPIN: SIGNIFICANT CHANGE UP
-  TETRACYCLINE: SIGNIFICANT CHANGE UP
-  TRIMETHOPRIM/SULFAMETHOXAZOLE: SIGNIFICANT CHANGE UP
-  VANCOMYCIN: SIGNIFICANT CHANGE UP
ALBUMIN SERPL ELPH-MCNC: 3.2 G/DL — LOW (ref 3.5–5.2)
ALP SERPL-CCNC: 62 U/L — SIGNIFICANT CHANGE UP (ref 30–115)
ALT FLD-CCNC: 11 U/L — SIGNIFICANT CHANGE UP (ref 0–41)
ANION GAP SERPL CALC-SCNC: 14 MMOL/L — SIGNIFICANT CHANGE UP (ref 7–14)
ANION GAP SERPL CALC-SCNC: 14 MMOL/L — SIGNIFICANT CHANGE UP (ref 7–14)
AST SERPL-CCNC: 15 U/L — SIGNIFICANT CHANGE UP (ref 0–41)
BASOPHILS # BLD AUTO: 0.02 K/UL — SIGNIFICANT CHANGE UP (ref 0–0.2)
BASOPHILS NFR BLD AUTO: 0.2 % — SIGNIFICANT CHANGE UP (ref 0–1)
BILIRUB SERPL-MCNC: 0.3 MG/DL — SIGNIFICANT CHANGE UP (ref 0.2–1.2)
BLD GP AB SCN SERPL QL: SIGNIFICANT CHANGE UP
BUN SERPL-MCNC: 14 MG/DL — SIGNIFICANT CHANGE UP (ref 10–20)
BUN SERPL-MCNC: 8 MG/DL — LOW (ref 10–20)
CALCIUM SERPL-MCNC: 8.9 MG/DL — SIGNIFICANT CHANGE UP (ref 8.5–10.1)
CALCIUM SERPL-MCNC: 9 MG/DL — SIGNIFICANT CHANGE UP (ref 8.5–10.1)
CHLORIDE SERPL-SCNC: 96 MMOL/L — LOW (ref 98–110)
CHLORIDE SERPL-SCNC: 96 MMOL/L — LOW (ref 98–110)
CO2 SERPL-SCNC: 26 MMOL/L — SIGNIFICANT CHANGE UP (ref 17–32)
CO2 SERPL-SCNC: 29 MMOL/L — SIGNIFICANT CHANGE UP (ref 17–32)
CREAT SERPL-MCNC: 0.7 MG/DL — SIGNIFICANT CHANGE UP (ref 0.7–1.5)
CREAT SERPL-MCNC: 1 MG/DL — SIGNIFICANT CHANGE UP (ref 0.7–1.5)
EOSINOPHIL # BLD AUTO: 0.06 K/UL — SIGNIFICANT CHANGE UP (ref 0–0.7)
EOSINOPHIL NFR BLD AUTO: 0.7 % — SIGNIFICANT CHANGE UP (ref 0–8)
GLUCOSE SERPL-MCNC: 109 MG/DL — HIGH (ref 70–99)
GLUCOSE SERPL-MCNC: 83 MG/DL — SIGNIFICANT CHANGE UP (ref 70–99)
HCT VFR BLD CALC: 36.3 % — LOW (ref 42–52)
HCT VFR BLD CALC: 37.2 % — LOW (ref 42–52)
HGB BLD-MCNC: 12.4 G/DL — LOW (ref 14–18)
HGB BLD-MCNC: 12.4 G/DL — LOW (ref 14–18)
IMM GRANULOCYTES NFR BLD AUTO: 0.5 % — HIGH (ref 0.1–0.3)
LYMPHOCYTES # BLD AUTO: 1.26 K/UL — SIGNIFICANT CHANGE UP (ref 1.2–3.4)
LYMPHOCYTES # BLD AUTO: 15.3 % — LOW (ref 20.5–51.1)
MAGNESIUM SERPL-MCNC: 2 MG/DL — SIGNIFICANT CHANGE UP (ref 1.8–2.4)
MAGNESIUM SERPL-MCNC: 2.3 MG/DL — SIGNIFICANT CHANGE UP (ref 1.8–2.4)
MCHC RBC-ENTMCNC: 28.7 PG — SIGNIFICANT CHANGE UP (ref 27–31)
MCHC RBC-ENTMCNC: 29 PG — SIGNIFICANT CHANGE UP (ref 27–31)
MCHC RBC-ENTMCNC: 33.3 G/DL — SIGNIFICANT CHANGE UP (ref 32–37)
MCHC RBC-ENTMCNC: 34.2 G/DL — SIGNIFICANT CHANGE UP (ref 32–37)
MCV RBC AUTO: 84.8 FL — SIGNIFICANT CHANGE UP (ref 80–94)
MCV RBC AUTO: 86.1 FL — SIGNIFICANT CHANGE UP (ref 80–94)
METHOD TYPE: SIGNIFICANT CHANGE UP
MONOCYTES # BLD AUTO: 0.61 K/UL — HIGH (ref 0.1–0.6)
MONOCYTES NFR BLD AUTO: 7.4 % — SIGNIFICANT CHANGE UP (ref 1.7–9.3)
NEUTROPHILS # BLD AUTO: 6.24 K/UL — SIGNIFICANT CHANGE UP (ref 1.4–6.5)
NEUTROPHILS NFR BLD AUTO: 75.9 % — HIGH (ref 42.2–75.2)
NRBC # BLD: 0 /100 WBCS — SIGNIFICANT CHANGE UP (ref 0–0)
NRBC # BLD: 0 /100 WBCS — SIGNIFICANT CHANGE UP (ref 0–0)
PLATELET # BLD AUTO: 212 K/UL — SIGNIFICANT CHANGE UP (ref 130–400)
PLATELET # BLD AUTO: 250 K/UL — SIGNIFICANT CHANGE UP (ref 130–400)
POTASSIUM SERPL-MCNC: 4.2 MMOL/L — SIGNIFICANT CHANGE UP (ref 3.5–5)
POTASSIUM SERPL-MCNC: 4.8 MMOL/L — SIGNIFICANT CHANGE UP (ref 3.5–5)
POTASSIUM SERPL-SCNC: 4.2 MMOL/L — SIGNIFICANT CHANGE UP (ref 3.5–5)
POTASSIUM SERPL-SCNC: 4.8 MMOL/L — SIGNIFICANT CHANGE UP (ref 3.5–5)
PROT SERPL-MCNC: 6.8 G/DL — SIGNIFICANT CHANGE UP (ref 6–8)
RBC # BLD: 4.28 M/UL — LOW (ref 4.7–6.1)
RBC # BLD: 4.32 M/UL — LOW (ref 4.7–6.1)
RBC # FLD: 14.3 % — SIGNIFICANT CHANGE UP (ref 11.5–14.5)
RBC # FLD: 14.4 % — SIGNIFICANT CHANGE UP (ref 11.5–14.5)
RPR SER-TITR: ABNORMAL
SODIUM SERPL-SCNC: 136 MMOL/L — SIGNIFICANT CHANGE UP (ref 135–146)
SODIUM SERPL-SCNC: 139 MMOL/L — SIGNIFICANT CHANGE UP (ref 135–146)
TYPE + AB SCN PNL BLD: SIGNIFICANT CHANGE UP
VANCOMYCIN TROUGH SERPL-MCNC: 17.4 UG/ML — HIGH (ref 5–10)
VANCOMYCIN TROUGH SERPL-MCNC: 6.8 UG/ML — SIGNIFICANT CHANGE UP (ref 5–10)
WBC # BLD: 8.12 K/UL — SIGNIFICANT CHANGE UP (ref 4.8–10.8)
WBC # BLD: 8.23 K/UL — SIGNIFICANT CHANGE UP (ref 4.8–10.8)
WBC # FLD AUTO: 8.12 K/UL — SIGNIFICANT CHANGE UP (ref 4.8–10.8)
WBC # FLD AUTO: 8.23 K/UL — SIGNIFICANT CHANGE UP (ref 4.8–10.8)

## 2018-11-28 RX ORDER — OXYCODONE AND ACETAMINOPHEN 5; 325 MG/1; MG/1
1 TABLET ORAL EVERY 6 HOURS
Qty: 0 | Refills: 0 | Status: DISCONTINUED | OUTPATIENT
Start: 2018-11-28 | End: 2018-11-30

## 2018-11-28 RX ORDER — SODIUM CHLORIDE 9 MG/ML
1000 INJECTION, SOLUTION INTRAVENOUS
Qty: 0 | Refills: 0 | Status: DISCONTINUED | OUTPATIENT
Start: 2018-11-28 | End: 2018-11-28

## 2018-11-28 RX ORDER — VANCOMYCIN HCL 1 G
1250 VIAL (EA) INTRAVENOUS EVERY 12 HOURS
Qty: 0 | Refills: 0 | Status: DISCONTINUED | OUTPATIENT
Start: 2018-11-28 | End: 2018-11-28

## 2018-11-28 RX ORDER — HYDROMORPHONE HYDROCHLORIDE 2 MG/ML
1 INJECTION INTRAMUSCULAR; INTRAVENOUS; SUBCUTANEOUS
Qty: 0 | Refills: 0 | Status: DISCONTINUED | OUTPATIENT
Start: 2018-11-28 | End: 2018-11-28

## 2018-11-28 RX ORDER — ONDANSETRON 8 MG/1
4 TABLET, FILM COATED ORAL ONCE
Qty: 0 | Refills: 0 | Status: DISCONTINUED | OUTPATIENT
Start: 2018-11-28 | End: 2018-11-28

## 2018-11-28 RX ORDER — HYDROMORPHONE HYDROCHLORIDE 2 MG/ML
0.5 INJECTION INTRAMUSCULAR; INTRAVENOUS; SUBCUTANEOUS
Qty: 0 | Refills: 0 | Status: DISCONTINUED | OUTPATIENT
Start: 2018-11-28 | End: 2018-11-28

## 2018-11-28 RX ORDER — PIPERACILLIN AND TAZOBACTAM 4; .5 G/20ML; G/20ML
3.38 INJECTION, POWDER, LYOPHILIZED, FOR SOLUTION INTRAVENOUS EVERY 6 HOURS
Qty: 0 | Refills: 0 | Status: DISCONTINUED | OUTPATIENT
Start: 2018-11-28 | End: 2018-11-28

## 2018-11-28 RX ORDER — ACETAMINOPHEN 500 MG
650 TABLET ORAL EVERY 8 HOURS
Qty: 0 | Refills: 0 | Status: DISCONTINUED | OUTPATIENT
Start: 2018-11-28 | End: 2018-11-29

## 2018-11-28 RX ORDER — VANCOMYCIN HCL 1 G
1500 VIAL (EA) INTRAVENOUS EVERY 12 HOURS
Qty: 0 | Refills: 0 | Status: DISCONTINUED | OUTPATIENT
Start: 2018-11-28 | End: 2018-11-30

## 2018-11-28 RX ORDER — MORPHINE SULFATE 50 MG/1
2 CAPSULE, EXTENDED RELEASE ORAL EVERY 4 HOURS
Qty: 0 | Refills: 0 | Status: DISCONTINUED | OUTPATIENT
Start: 2018-11-28 | End: 2018-11-30

## 2018-11-28 RX ADMIN — OXYCODONE AND ACETAMINOPHEN 1 TABLET(S): 5; 325 TABLET ORAL at 14:44

## 2018-11-28 RX ADMIN — PIPERACILLIN AND TAZOBACTAM 100 GRAM(S): 4; .5 INJECTION, POWDER, LYOPHILIZED, FOR SOLUTION INTRAVENOUS at 00:33

## 2018-11-28 RX ADMIN — OXYCODONE AND ACETAMINOPHEN 1 TABLET(S): 5; 325 TABLET ORAL at 07:27

## 2018-11-28 RX ADMIN — MORPHINE SULFATE 2 MILLIGRAM(S): 50 CAPSULE, EXTENDED RELEASE ORAL at 23:09

## 2018-11-28 RX ADMIN — OXYCODONE AND ACETAMINOPHEN 1 TABLET(S): 5; 325 TABLET ORAL at 21:50

## 2018-11-28 RX ADMIN — Medication 100 MILLIGRAM(S): at 21:13

## 2018-11-28 RX ADMIN — OXYCODONE AND ACETAMINOPHEN 1 TABLET(S): 5; 325 TABLET ORAL at 21:18

## 2018-11-28 RX ADMIN — Medication 166.67 MILLIGRAM(S): at 14:00

## 2018-11-28 RX ADMIN — PIPERACILLIN AND TAZOBACTAM 100 GRAM(S): 4; .5 INJECTION, POWDER, LYOPHILIZED, FOR SOLUTION INTRAVENOUS at 05:07

## 2018-11-28 RX ADMIN — Medication 100 MILLIGRAM(S): at 05:08

## 2018-11-28 RX ADMIN — MORPHINE SULFATE 2 MILLIGRAM(S): 50 CAPSULE, EXTENDED RELEASE ORAL at 22:29

## 2018-11-28 RX ADMIN — Medication 100 MILLIGRAM(S): at 14:46

## 2018-11-28 NOTE — PROGRESS NOTE ADULT - ASSESSMENT
27 yo M hx of HIV not on HAART due to insurance issue (last CD4 - 600 and viral load 150K 2 months ago as per patient) and recent MRSA to his finger 3 months ago which was treated in [place], hx of syphilis presents to ED for right buttock abscess/redness/swelling and pain worsening in the past 2 days.      Right Buttock/groin abscess  - s/p I & D at bedside with purulent drainage.  - c/w Vanco, doxycycline and zosyn for now as per ID  - was concerning for necrotizing fascitis, CT scan of abdomen and pelvis with I/V con urgent was done which did not show subcut emphysema or abscess  - Burn following; possible debridement today   - Wound Cx shows Mod Staph aureus, BCx NGTD  - pain control   - ID following    HIV  - not on HAART for the past 2-3 years due to insurance issue.   - CD4 count 374/ HIV viral load pending  - ID following  - pt might qualify for housing and insurance given he has HIV  - social workers are working on authorization, will start him on therapy once he gets auth.    Hx of Syphilis  - patient reports that he was receiving penicillin IM but has missed the last dose.   - RPR is 1:4, do not have records from the past to assess for 4 fold decrease  - ID on board, will follow up with them      DVT ppx: Lovenox SC  GI ppx: not indicated.   Diet: Regular diet  Activity: ambulates as tolerated.   Dispo: pt is homeless and needs to be setup for housing and insurance

## 2018-11-28 NOTE — BRIEF OPERATIVE NOTE - OPERATION/FINDINGS
purulent drainage and fat necrosis right buttock and right groin--> excision to and including fascia

## 2018-11-28 NOTE — PROGRESS NOTE ADULT - SUBJECTIVE AND OBJECTIVE BOX
WOODY KOHLI  28y, Male      OVERNIGHT EVENTS:  fever curve downtrending  debridement this AM  wcx with s. aureus    ROS negative except as per above    VITALS:  T(F): 99.6, Max: 99.8 (11-28-18 @ 05:21)  HR: 96  BP: 117/69  RR: 18Vital Signs Last 24 Hrs  T(C): 37.6 (28 Nov 2018 06:29), Max: 37.7 (28 Nov 2018 05:21)  T(F): 99.6 (28 Nov 2018 06:29), Max: 99.8 (28 Nov 2018 05:21)  HR: 96 (28 Nov 2018 06:29) (86 - 97)  BP: 117/69 (28 Nov 2018 06:29) (100/61 - 156/65)  BP(mean): --  RR: 18 (28 Nov 2018 05:21) (18 - 18)  SpO2: 98% (28 Nov 2018 06:29) (98% - 98%)    PHYSICAL EXAM  Gen: Awake and alert, non-toxic appearing, NAD  HEENT: NCAT. EOMI. MMM. no thrush  Neck: Supple, no cervical LAD  CV: RRR, no murmurs  Lungs: CTAB, no w/r/r  Abd: Soft. NTND  : L gluteal perineal abscess s/p I&D, induration, erythema extending to scrotum TTP  Extr: wwp, no edema  Skin: no rash  Neuro: No focal deficits  Lines: clean    TESTS & MEASUREMENTS:                        12.4   8.23  )-----------( 212      ( 28 Nov 2018 08:44 )             36.3     11-28    136  |  96<L>  |  8<L>  ----------------------------<  83  4.2   |  26  |  0.7    Ca    8.9      28 Nov 2018 08:44  Mg     2.0     11-28    TPro  6.8  /  Alb  3.2<L>  /  TBili  0.3  /  DBili  x   /  AST  15  /  ALT  11  /  AlkPhos  62  11-28    LIVER FUNCTIONS - ( 28 Nov 2018 08:44 )  Alb: 3.2 g/dL / Pro: 6.8 g/dL / ALK PHOS: 62 U/L / ALT: 11 U/L / AST: 15 U/L / GGT: x             Culture - Abscess with Gram Stain (collected 11-25-18 @ 23:13)  Source: .Abscess right buttock  Preliminary Report (11-27-18 @ 18:10):    Numerous Staphylococcus aureus    Culture - Blood (collected 11-25-18 @ 22:55)  Source: .Blood Blood  Preliminary Report (11-27-18 @ 09:01):    No growth to date.          RADIOLOGY & ADDITIONAL TESTS:    ANTIBIOTICS:    clindamycin IVPB   100 mL/Hr IV Intermittent (11-28-18 @ 05:08)   100 mL/Hr IV Intermittent (11-27-18 @ 22:00)   100 mL/Hr IV Intermittent (11-27-18 @ 16:22)    clindamycin IVPB   100 mL/Hr IV Intermittent (11-27-18 @ 06:12)    meropenem  IVPB   100 mL/Hr IV Intermittent (11-27-18 @ 00:47)    meropenem  IVPB   100 mL/Hr IV Intermittent (11-27-18 @ 06:55)    piperacillin/tazobactam IVPB.   100 mL/Hr IV Intermittent (11-28-18 @ 05:07)   100 mL/Hr IV Intermittent (11-28-18 @ 00:33)   100 mL/Hr IV Intermittent (11-27-18 @ 18:31)    piperacillin/tazobactam IVPB.   200 mL/Hr IV Intermittent (11-26-18 @ 01:11)    vancomycin  IVPB   250 mL/Hr IV Intermittent (11-25-18 @ 23:29)    vancomycin  IVPB   166.67 mL/Hr IV Intermittent (11-27-18 @ 21:58)   166.67 mL/Hr IV Intermittent (11-27-18 @ 06:54)   166.67 mL/Hr IV Intermittent (11-26-18 @ 18:22)   166.67 mL/Hr IV Intermittent (11-26-18 @ 11:59)

## 2018-11-28 NOTE — PROGRESS NOTE ADULT - SUBJECTIVE AND OBJECTIVE BOX
WOODY KOHLI 28y Male  MRN#: [id number]   CODE STATUS: FULL      SUBJECTIVE  Patient is a 28y old Male who presents with a chief complaint of Right buttock abscess (27 Nov 2018 16:54)  Currently admitted to medicine with the primary diagnosis of Abscess of buttock, right  Hospital course has been complicated by undergoing I and D in the ED, pain and drainage in the groin, gluteal region.  Today is hospital day 2d, and this morning he is still complaining of some pain in the region of abscess and is also complaining of purulent mixed with blood discharge from the site.  His fever is trending down and he did not spike up fever after yesterday evening. He is NPO for possible debridement sx today.    OBJECTIVE  PAST MEDICAL & SURGICAL HISTORY  Syphilis  HIV (human immunodeficiency virus infection)  No significant past surgical history    ALLERGIES:  Haldol (Other)    MEDICATIONS:  STANDING MEDICATIONS  clindamycin IVPB 900 milliGRAM(s) IV Intermittent every 8 hours  enoxaparin Injectable 40 milliGRAM(s) SubCutaneous daily  influenza   Vaccine 0.5 milliLiter(s) IntraMuscular once  piperacillin/tazobactam IVPB. 3.375 Gram(s) IV Intermittent every 6 hours  vancomycin  IVPB 1250 milliGRAM(s) IV Intermittent every 12 hours    PRN MEDICATIONS  acetaminophen   Tablet .. 650 milliGRAM(s) Oral every 8 hours PRN  oxyCODONE    5 mG/acetaminophen 325 mG 1 Tablet(s) Oral every 6 hours PRN      VITAL SIGNS: Last 24 Hours  T(C): 37.6 (28 Nov 2018 06:29), Max: 37.7 (28 Nov 2018 05:21)  T(F): 99.6 (28 Nov 2018 06:29), Max: 99.8 (28 Nov 2018 05:21)  HR: 96 (28 Nov 2018 06:29) (86 - 97)  BP: 117/69 (28 Nov 2018 06:29) (100/61 - 156/65)  BP(mean): --  RR: 18 (28 Nov 2018 05:21) (18 - 18)  SpO2: 98% (28 Nov 2018 06:29) (98% - 98%)    LABS:                        12.4   8.23  )-----------( 212      ( 28 Nov 2018 08:44 )             36.3     11-27    140  |  101  |  11  ----------------------------<  91  4.7   |  27  |  0.8    Ca    8.7      27 Nov 2018 09:13  Mg     1.9     11-26                Culture - Abscess with Gram Stain (collected 25 Nov 2018 23:13)  Source: .Abscess right buttock  Preliminary Report (27 Nov 2018 18:10):    Numerous Staphylococcus aureus    Culture - Blood (collected 25 Nov 2018 22:55)  Source: .Blood Blood  Preliminary Report (27 Nov 2018 09:01):    No growth to date.          RADIOLOGY:  < from: CT Abdomen and Pelvis w/ IV Cont (11.27.18 @ 21:28) >  IMPRESSION:   1. Diffuse subcutaneous inflammatory stranding and swelling with in right   upper medial thigh and right groin, consistent with cellulitis, without   CT evidence of abscess or subcutaneous emphysema to suggest necrotizing   fasciitis at this time. Close clinical monitoring recommended.    < end of copied text >      PHYSICAL EXAM:    GENERAL: NAD, well-developed, AAOx3  HEENT:  Atraumatic, Normocephalic. EOMI, PERRLA, conjunctiva and sclera clear, No JVD  PULMONARY: Clear to auscultation bilaterally; No wheeze  CARDIOVASCULAR: Regular rate and rhythm; No murmurs, rubs, or gallops  GASTROINTESTINAL: Soft, Nontender, Nondistended; Bowel sounds present  MUSCULOSKELETAL:  2+ Peripheral Pulses, No clubbing, cyanosis, or edema  NEUROLOGY: non-focal  SKIN: wound in the right gluteal and thigh region with purulent mixed with blood discharge

## 2018-11-28 NOTE — PROGRESS NOTE ADULT - ASSESSMENT
28M    Asymptomatic HIV  Recent MRSA skin infection of the finger    Admitted with L groin/cellulitis/abscess  Continued fevers  s/p I&D pending cx    CT AP negative for fasciitis or collection  debridement this AM  wcx with s. aureus    #Abscess  - Burn consult appreciated: debridement today  - INCREASE to Vanc 1.5 q12h  - Repeat trough prior to 4th dose  - Clinda 900mg q8h IV  - D/C zosyn 3.375 q6h  - f/u cultures    #HIV CD4 375; 29%  - Previously on Genvoya, off for 2 years  - Dx 2016 while in half-way  - Needs insurance, housing  - If insurance confirmed, restart Genvoya  - f/u VL  - Send quantiferon gold  - STI screen urine and rectum for G/C    #Syphilis- reports 2/3 IM PCN  - TITer 1:4  - Call [facility] [place] [facility] in [place], [place] for records of RPR titers and therapy    Spectra [phone]

## 2018-11-28 NOTE — CHART NOTE - NSCHARTNOTEFT_GEN_A_CORE
PACU ANESTHESIA       Procedure: I and D of right groin and buttock  Post op diagnosis Abscess of right groin and buttock      ____ Intubated  TV:______       Rate: ______      FiO2: ______    ___x_ Patent Airway    _x___ Full return of protective reflexes    ____ Full recovery from anesthesia / sedation to baseline status    Viitals:  T(C): 37.6 (11-28-18 @ 06:29), Max: 37.7 (11-28-18 @ 05:21)  HR: 96 (11-28-18 @ 06:29) (86 - 97)  BP: 117/69 (11-28-18 @ 06:29) (100/61 - 156/65)  RR: 18 (11-28-18 @ 05:21) (18 - 18)  SpO2: 98% (11-28-18 @ 06:29) (98% - 98%)            Mental Status:  __x__ Awake   _____ Alert   _____ Drowsy   _____ Sedated    Nausea/Vomiting: ____ Yes, See Post - Op Orders      ____x No    Pain Scale (0-10): ___0__    Treatment: ____ None    ____ See Post - Op/PCA Orders    Post - Operative Fluids:   x____ Oral   ____ See Post - Op Orders    Plan:         Discharge:   ____Home       ____x_Floor         _____Critical Care    _____Other:_________________    Comments: Spontaneously breathing hemodynamically stable

## 2018-11-28 NOTE — BRIEF OPERATIVE NOTE - PROCEDURE
<<-----Click on this checkbox to enter Procedure Abscess drainage  11/28/2018  abscess drainage right buttock and right groin and sharp excisional debridement  [id number]  Active  [id number]

## 2018-11-29 LAB
ALBUMIN SERPL ELPH-MCNC: 3.1 G/DL — LOW (ref 3.5–5.2)
ALP SERPL-CCNC: 55 U/L — SIGNIFICANT CHANGE UP (ref 30–115)
ALT FLD-CCNC: 11 U/L — SIGNIFICANT CHANGE UP (ref 0–41)
ANION GAP SERPL CALC-SCNC: 16 MMOL/L — HIGH (ref 7–14)
AST SERPL-CCNC: 14 U/L — SIGNIFICANT CHANGE UP (ref 0–41)
BASOPHILS # BLD AUTO: 0.01 K/UL — SIGNIFICANT CHANGE UP (ref 0–0.2)
BASOPHILS NFR BLD AUTO: 0.1 % — SIGNIFICANT CHANGE UP (ref 0–1)
BILIRUB SERPL-MCNC: <0.2 MG/DL — SIGNIFICANT CHANGE UP (ref 0.2–1.2)
BUN SERPL-MCNC: 13 MG/DL — SIGNIFICANT CHANGE UP (ref 10–20)
CALCIUM SERPL-MCNC: 9.2 MG/DL — SIGNIFICANT CHANGE UP (ref 8.5–10.1)
CHLORIDE SERPL-SCNC: 100 MMOL/L — SIGNIFICANT CHANGE UP (ref 98–110)
CO2 SERPL-SCNC: 25 MMOL/L — SIGNIFICANT CHANGE UP (ref 17–32)
CREAT SERPL-MCNC: 0.7 MG/DL — SIGNIFICANT CHANGE UP (ref 0.7–1.5)
EOSINOPHIL # BLD AUTO: 0 K/UL — SIGNIFICANT CHANGE UP (ref 0–0.7)
EOSINOPHIL NFR BLD AUTO: 0 % — SIGNIFICANT CHANGE UP (ref 0–8)
GLUCOSE SERPL-MCNC: 139 MG/DL — HIGH (ref 70–99)
HCT VFR BLD CALC: 34.4 % — LOW (ref 42–52)
HGB BLD-MCNC: 11.7 G/DL — LOW (ref 14–18)
IMM GRANULOCYTES NFR BLD AUTO: 0.6 % — HIGH (ref 0.1–0.3)
LYMPHOCYTES # BLD AUTO: 1.76 K/UL — SIGNIFICANT CHANGE UP (ref 1.2–3.4)
LYMPHOCYTES # BLD AUTO: 17.7 % — LOW (ref 20.5–51.1)
MCHC RBC-ENTMCNC: 28.7 PG — SIGNIFICANT CHANGE UP (ref 27–31)
MCHC RBC-ENTMCNC: 34 G/DL — SIGNIFICANT CHANGE UP (ref 32–37)
MCV RBC AUTO: 84.3 FL — SIGNIFICANT CHANGE UP (ref 80–94)
MONOCYTES # BLD AUTO: 0.56 K/UL — SIGNIFICANT CHANGE UP (ref 0.1–0.6)
MONOCYTES NFR BLD AUTO: 5.6 % — SIGNIFICANT CHANGE UP (ref 1.7–9.3)
NEUTROPHILS # BLD AUTO: 7.58 K/UL — HIGH (ref 1.4–6.5)
NEUTROPHILS NFR BLD AUTO: 76 % — HIGH (ref 42.2–75.2)
NRBC # BLD: 0 /100 WBCS — SIGNIFICANT CHANGE UP (ref 0–0)
PLATELET # BLD AUTO: 241 K/UL — SIGNIFICANT CHANGE UP (ref 130–400)
POTASSIUM SERPL-MCNC: 4.3 MMOL/L — SIGNIFICANT CHANGE UP (ref 3.5–5)
POTASSIUM SERPL-SCNC: 4.3 MMOL/L — SIGNIFICANT CHANGE UP (ref 3.5–5)
PROT SERPL-MCNC: 6.8 G/DL — SIGNIFICANT CHANGE UP (ref 6–8)
RBC # BLD: 4.08 M/UL — LOW (ref 4.7–6.1)
RBC # FLD: 14.4 % — SIGNIFICANT CHANGE UP (ref 11.5–14.5)
SODIUM SERPL-SCNC: 141 MMOL/L — SIGNIFICANT CHANGE UP (ref 135–146)
WBC # BLD: 9.97 K/UL — SIGNIFICANT CHANGE UP (ref 4.8–10.8)
WBC # FLD AUTO: 9.97 K/UL — SIGNIFICANT CHANGE UP (ref 4.8–10.8)

## 2018-11-29 RX ORDER — ENOXAPARIN SODIUM 100 MG/ML
40 INJECTION SUBCUTANEOUS DAILY
Qty: 0 | Refills: 0 | Status: DISCONTINUED | OUTPATIENT
Start: 2018-11-29 | End: 2018-11-30

## 2018-11-29 RX ORDER — ACETAMINOPHEN 500 MG
650 TABLET ORAL EVERY 4 HOURS
Qty: 0 | Refills: 0 | Status: DISCONTINUED | OUTPATIENT
Start: 2018-11-29 | End: 2018-11-30

## 2018-11-29 RX ADMIN — OXYCODONE AND ACETAMINOPHEN 1 TABLET(S): 5; 325 TABLET ORAL at 17:58

## 2018-11-29 RX ADMIN — OXYCODONE AND ACETAMINOPHEN 1 TABLET(S): 5; 325 TABLET ORAL at 07:03

## 2018-11-29 RX ADMIN — MORPHINE SULFATE 2 MILLIGRAM(S): 50 CAPSULE, EXTENDED RELEASE ORAL at 04:30

## 2018-11-29 RX ADMIN — MORPHINE SULFATE 2 MILLIGRAM(S): 50 CAPSULE, EXTENDED RELEASE ORAL at 21:06

## 2018-11-29 RX ADMIN — Medication 100 MILLIGRAM(S): at 21:06

## 2018-11-29 RX ADMIN — Medication 100 MILLIGRAM(S): at 13:00

## 2018-11-29 RX ADMIN — ENOXAPARIN SODIUM 40 MILLIGRAM(S): 100 INJECTION SUBCUTANEOUS at 13:01

## 2018-11-29 RX ADMIN — Medication 300 MILLIGRAM(S): at 02:56

## 2018-11-29 RX ADMIN — Medication 300 MILLIGRAM(S): at 17:55

## 2018-11-29 RX ADMIN — MORPHINE SULFATE 2 MILLIGRAM(S): 50 CAPSULE, EXTENDED RELEASE ORAL at 03:59

## 2018-11-29 RX ADMIN — OXYCODONE AND ACETAMINOPHEN 1 TABLET(S): 5; 325 TABLET ORAL at 06:27

## 2018-11-29 RX ADMIN — Medication 100 MILLIGRAM(S): at 05:22

## 2018-11-29 RX ADMIN — MORPHINE SULFATE 2 MILLIGRAM(S): 50 CAPSULE, EXTENDED RELEASE ORAL at 13:00

## 2018-11-29 RX ADMIN — Medication 300 MILLIGRAM(S): at 05:22

## 2018-11-29 NOTE — PROGRESS NOTE ADULT - SUBJECTIVE AND OBJECTIVE BOX
WOODY KOHLI 28y Male  MRN#: [id number]   CODE STATUS: FULL    SUBJECTIVE  Patient is a 28y old Male who presents with a chief complaint of Right buttock abscess (27 Nov 2018 16:54)  Currently admitted to medicine with the primary diagnosis of Abscess of buttock, right  Hospital course has been complicated by undergoing I and D in the ED, pain and drainage in the groin, gluteal region.  Today is hospital day 3d, and this morning he is s/p debridement of the abscess on 11/28/2018 and is complaining of pain in the area.  He is afebrile now.      OBJECTIVE  PAST MEDICAL & SURGICAL HISTORY  Syphilis  HIV (human immunodeficiency virus infection)  No significant past surgical history    ALLERGIES:  Haldol (Other)    MEDICATIONS:  STANDING MEDICATIONS  clindamycin IVPB 900 milliGRAM(s) IV Intermittent every 8 hours  enoxaparin Injectable 40 milliGRAM(s) SubCutaneous daily  influenza   Vaccine 0.5 milliLiter(s) IntraMuscular once  vancomycin  IVPB 1500 milliGRAM(s) IV Intermittent every 12 hours    PRN MEDICATIONS  acetaminophen   Tablet .. 650 milliGRAM(s) Oral every 4 hours PRN  morphine  - Injectable 2 milliGRAM(s) IV Push every 4 hours PRN  oxyCODONE    5 mG/acetaminophen 325 mG 1 Tablet(s) Oral every 6 hours PRN      VITAL SIGNS: Last 24 Hours  T(C): 36.1 (29 Nov 2018 12:59), Max: 36.1 (28 Nov 2018 21:17)  T(F): 96.9 (29 Nov 2018 12:59), Max: 97 (28 Nov 2018 21:17)  HR: 73 (29 Nov 2018 12:59) (73 - 80)  BP: 104/58 (29 Nov 2018 12:59) (104/58 - 107/66)  BP(mean): --  RR: 18 (29 Nov 2018 12:59) (18 - 18)  SpO2: --    LABS:                        11.7   9.97  )-----------( 241      ( 29 Nov 2018 09:38 )             34.4     11-29    141  |  100  |  13  ----------------------------<  139<H>  4.3   |  25  |  0.7    Ca    9.2      29 Nov 2018 09:38  Mg     2.3     11-28    TPro  6.8  /  Alb  3.1<L>  /  TBili  <0.2  /  DBili  x   /  AST  14  /  ALT  11  /  AlkPhos  55  11-29                  RADIOLOGY:  < from: CT Abdomen and Pelvis w/ IV Cont (11.27.18 @ 21:28) >    IMPRESSION:   1. Diffuse subcutaneous inflammatory stranding and swelling with in right   upper medial thigh and right groin, consistent with cellulitis, without   CT evidence of abscess or subcutaneous emphysema to suggest necrotizing   fasciitis at this time. Close clinical monitoring recommended.    < end of copied text >      PHYSICAL EXAM:    GENERAL: NAD, well-developed, AAOx3  HEENT:  Atraumatic, Normocephalic. EOMI, PERRLA, conjunctiva and sclera clear, No JVD  PULMONARY: Clear to auscultation bilaterally; No wheeze  CARDIOVASCULAR: Regular rate and rhythm; No murmurs, rubs, or gallops  GASTROINTESTINAL: Soft, Nontender, Nondistended; Bowel sounds present  MUSCULOSKELETAL:  2+ Peripheral Pulses, No clubbing, cyanosis, or edema  NEUROLOGY: non-focal  SKIN: wound in the right gluteal and thigh

## 2018-11-29 NOTE — PROGRESS NOTE ADULT - ASSESSMENT
27 yo M hx of HIV not on HAART due to insurance issue (last CD4 - 600 and viral load 150K 2 months ago as per patient) and recent MRSA to his finger 3 months ago which was treated in [place], hx of syphilis presents to ED for right buttock abscess/redness/swelling and pain worsening in the past 2 days.      Right Buttock/groin abscess  - s/p I & D at bedside with purulent drainage and then in OR on 11/28/2018 with burn  - c/w Vanco and doxycycline for now as per ID  - not concerning for necrotizing fascitis, CT scan of abdomen and pelvis with I/V con urgent was done which did not show subcut emphysema or abscess  - Burn following; possible debridement again tomorrow  - Wound Cx shows MRSA, BCx NGTD  - pain control   - ID following    HIV  - not on HAART for the past 2-3 years due to insurance issue.   - CD4 count 374/ HIV viral load pending  - ID following  - pt might qualify for housing and insurance   - will start him on therapy once he gets insurance.    Hx of Syphilis  - patient reports that he was receiving penicillin IM but has missed the last dose.   - RPR is 1:4, Called [facility] in [place], [place] for records of RPR titers and therapy, faxed them the HIPPA release form, they will fax records to us.  - ID on board, will follow up with them      DVT ppx: Lovenox SC  GI ppx: not indicated.   Diet: Regular diet, NPO after midnight   Activity: ambulates as tolerated.   Dispo: pt is homeless and needs to be setup for housing and insurance

## 2018-11-29 NOTE — PROGRESS NOTE ADULT - SUBJECTIVE AND OBJECTIVE BOX
WOODY KOHLI  28y, Male      OVERNIGHT EVENTS:  afebrile  s/p debridement in OR   wcx with MRSA    ROS negative except as per above    VITALS:  T(F): 96.7, Max: 97.6 (11-28-18 @ 13:43)  HR: 79  BP: 107/66  RR: 18Vital Signs Last 24 Hrs  T(C): 35.9 (29 Nov 2018 05:37), Max: 36.4 (28 Nov 2018 12:58)  T(F): 96.7 (29 Nov 2018 05:37), Max: 97.6 (28 Nov 2018 13:43)  HR: 79 (29 Nov 2018 05:37) (79 - 120)  BP: 107/66 (29 Nov 2018 05:37) (107/57 - 125/82)  BP(mean): --  RR: 18 (29 Nov 2018 05:37) (13 - 29)  SpO2: 100% (28 Nov 2018 13:43) (100% - 100%)    PHYSICAL EXAM  Gen: Awake and alert, non-toxic appearing, NAD  HEENT: NCAT. EOMI. MMM. no thrush  Neck: Supple, no cervical LAD  CV: RRR, no murmurs  Lungs: CTAB, no w/r/r  Abd: Soft. NTND  : L gluteal perineal abscess s/p I&D, induration, decreased  erythema  Extr: wwp, no edema  Skin: no rash  Neuro: No focal deficits  Lines: clean      TESTS & MEASUREMENTS:                        11.7   9.97  )-----------( 241      ( 29 Nov 2018 09:38 )             34.4     11-29    141  |  100  |  13  ----------------------------<  139<H>  4.3   |  25  |  0.7    Ca    9.2      29 Nov 2018 09:38  Mg     2.3     11-28    TPro  6.8  /  Alb  3.1<L>  /  TBili  <0.2  /  DBili  x   /  AST  14  /  ALT  11  /  AlkPhos  55  11-29    LIVER FUNCTIONS - ( 29 Nov 2018 09:38 )  Alb: 3.1 g/dL / Pro: 6.8 g/dL / ALK PHOS: 55 U/L / ALT: 11 U/L / AST: 14 U/L / GGT: x             Culture - Abscess with Gram Stain (collected 11-25-18 @ 23:13)  Source: .Abscess right buttock  Preliminary Report (11-28-18 @ 18:51):    Numerous Methicillin resistant Staphylococcus aureus  Organism: Methicillin resistant Staphylococcus aureus (11-28-18 @ 18:49)  Organism: Methicillin resistant Staphylococcus aureus (11-28-18 @ 18:49)      -  Ampicillin/Sulbactam: R 16/8      -  Cefazolin: R <=4      -  Clindamycin: R >4      -  Daptomycin: S 0.5      -  Erythromycin: R >4      -  Gentamicin: S <=1 Should not be used as monotherapy      -  Linezolid: S 2      -  Oxacillin: R >2      -  Penicillin: R >8      -  RIF- Rifampin: S <=1 Should not be used as monotherapy      -  Tetra/Doxy: S <=1      -  Trimethoprim/Sulfamethoxazole: S <=0.5/9.5      -  Vancomycin: S 2      Method Type: DARRIN    Culture - Blood (collected 11-25-18 @ 22:55)  Source: .Blood Blood  Preliminary Report (11-27-18 @ 09:01):    No growth to date.          RADIOLOGY & ADDITIONAL TESTS:    ANTIBIOTICS:    clindamycin IVPB   100 mL/Hr IV Intermittent (11-28-18 @ 05:08)   100 mL/Hr IV Intermittent (11-27-18 @ 22:00)   100 mL/Hr IV Intermittent (11-27-18 @ 16:22)    clindamycin IVPB   100 mL/Hr IV Intermittent (11-27-18 @ 06:12)    clindamycin IVPB   100 mL/Hr IV Intermittent (11-29-18 @ 05:22)   100 mL/Hr IV Intermittent (11-28-18 @ 21:13)   100 mL/Hr IV Intermittent (11-28-18 @ 14:46)    meropenem  IVPB   100 mL/Hr IV Intermittent (11-27-18 @ 00:47)    meropenem  IVPB   100 mL/Hr IV Intermittent (11-27-18 @ 06:55)    piperacillin/tazobactam IVPB.   100 mL/Hr IV Intermittent (11-28-18 @ 05:07)   100 mL/Hr IV Intermittent (11-28-18 @ 00:33)   100 mL/Hr IV Intermittent (11-27-18 @ 18:31)    piperacillin/tazobactam IVPB.   200 mL/Hr IV Intermittent (11-26-18 @ 01:11)    vancomycin  IVPB   250 mL/Hr IV Intermittent (11-25-18 @ 23:29)    vancomycin  IVPB   166.67 mL/Hr IV Intermittent (11-28-18 @ 14:00)   166.67 mL/Hr IV Intermittent (11-27-18 @ 21:58)   166.67 mL/Hr IV Intermittent (11-27-18 @ 06:54)   166.67 mL/Hr IV Intermittent (11-26-18 @ 18:22)   166.67 mL/Hr IV Intermittent (11-26-18 @ 11:59)    vancomycin  IVPB   300 mL/Hr IV Intermittent (11-29-18 @ 05:22)   300 mL/Hr IV Intermittent (11-29-18 @ 02:56)        clindamycin IVPB 900 milliGRAM(s) IV Intermittent every 8 hours  vancomycin  IVPB 1500 milliGRAM(s) IV Intermittent every 12 hours

## 2018-11-29 NOTE — PROGRESS NOTE ADULT - ASSESSMENT
28M    Asymptomatic HIV  Recent MRSA skin infection of the finger    Admitted with L groin/cellulitis/abscess  Continued fevers  s/p I&D pending cx    CT AP negative for fasciitis or collection  debridement this AM  wcx with s. aureus    #Abscess  - Vanc 1.5 q12h  - Repeat trough prior to 4th dose  - Clinda 900mg q8h IV  - f/u cultures  - Burn consult appreciated  - When stable for D/C Can change to PO bactrim 2DS tabs PO BID to complete 10 days    #HIV CD4 375; 29%  - Previously on Genvoya, off for 2 years  - Dx 2016 while in long-term  - Needs insurance, housing  - If insurance confirmed, restart Genvoya  - f/u VL  - Send quantiferon gold  - STI screen urine and rectum for G/C    #Syphilis- reports 2/3 IM PCN  - TiTer 1:4  - Call [facility] in [place], [place] for records of RPR titers and therapy    Spectra [phone]

## 2018-11-30 LAB
ALBUMIN SERPL ELPH-MCNC: 3.6 G/DL — SIGNIFICANT CHANGE UP (ref 3.5–5.2)
ALP SERPL-CCNC: 53 U/L — SIGNIFICANT CHANGE UP (ref 30–115)
ALT FLD-CCNC: 14 U/L — SIGNIFICANT CHANGE UP (ref 0–41)
ANION GAP SERPL CALC-SCNC: 15 MMOL/L — HIGH (ref 7–14)
AST SERPL-CCNC: 16 U/L — SIGNIFICANT CHANGE UP (ref 0–41)
BASOPHILS # BLD AUTO: 0.02 K/UL — SIGNIFICANT CHANGE UP (ref 0–0.2)
BASOPHILS NFR BLD AUTO: 0.4 % — SIGNIFICANT CHANGE UP (ref 0–1)
BILIRUB SERPL-MCNC: <0.2 MG/DL — SIGNIFICANT CHANGE UP (ref 0.2–1.2)
BLD GP AB SCN SERPL QL: SIGNIFICANT CHANGE UP
BUN SERPL-MCNC: 13 MG/DL — SIGNIFICANT CHANGE UP (ref 10–20)
CALCIUM SERPL-MCNC: 9.1 MG/DL — SIGNIFICANT CHANGE UP (ref 8.5–10.1)
CHLORIDE SERPL-SCNC: 100 MMOL/L — SIGNIFICANT CHANGE UP (ref 98–110)
CO2 SERPL-SCNC: 27 MMOL/L — SIGNIFICANT CHANGE UP (ref 17–32)
CREAT SERPL-MCNC: 0.8 MG/DL — SIGNIFICANT CHANGE UP (ref 0.7–1.5)
EOSINOPHIL # BLD AUTO: 0.04 K/UL — SIGNIFICANT CHANGE UP (ref 0–0.7)
EOSINOPHIL NFR BLD AUTO: 0.7 % — SIGNIFICANT CHANGE UP (ref 0–8)
GLUCOSE SERPL-MCNC: 77 MG/DL — SIGNIFICANT CHANGE UP (ref 70–99)
HCT VFR BLD CALC: 38 % — LOW (ref 42–52)
HGB BLD-MCNC: 12.4 G/DL — LOW (ref 14–18)
HIV-1 VIRAL LOAD RESULT: DETECTED
HIV1 RNA # SERPL NAA+PROBE: SIGNIFICANT CHANGE UP
HIV1 RNA SERPL NAA+PROBE-ACNC: DETECTED
HIV1 RNA SERPL NAA+PROBE-LOG#: 4.02 LG COP/ML — SIGNIFICANT CHANGE UP
IMM GRANULOCYTES NFR BLD AUTO: 0.7 % — HIGH (ref 0.1–0.3)
LYMPHOCYTES # BLD AUTO: 2.35 K/UL — SIGNIFICANT CHANGE UP (ref 1.2–3.4)
LYMPHOCYTES # BLD AUTO: 41.2 % — SIGNIFICANT CHANGE UP (ref 20.5–51.1)
MCHC RBC-ENTMCNC: 28.4 PG — SIGNIFICANT CHANGE UP (ref 27–31)
MCHC RBC-ENTMCNC: 32.6 G/DL — SIGNIFICANT CHANGE UP (ref 32–37)
MCV RBC AUTO: 87 FL — SIGNIFICANT CHANGE UP (ref 80–94)
MONOCYTES # BLD AUTO: 0.39 K/UL — SIGNIFICANT CHANGE UP (ref 0.1–0.6)
MONOCYTES NFR BLD AUTO: 6.8 % — SIGNIFICANT CHANGE UP (ref 1.7–9.3)
NEUTROPHILS # BLD AUTO: 2.87 K/UL — SIGNIFICANT CHANGE UP (ref 1.4–6.5)
NEUTROPHILS NFR BLD AUTO: 50.2 % — SIGNIFICANT CHANGE UP (ref 42.2–75.2)
NRBC # BLD: 0 /100 WBCS — SIGNIFICANT CHANGE UP (ref 0–0)
PLATELET # BLD AUTO: 333 K/UL — SIGNIFICANT CHANGE UP (ref 130–400)
POTASSIUM SERPL-MCNC: 4.6 MMOL/L — SIGNIFICANT CHANGE UP (ref 3.5–5)
POTASSIUM SERPL-SCNC: 4.6 MMOL/L — SIGNIFICANT CHANGE UP (ref 3.5–5)
PROT SERPL-MCNC: 7.1 G/DL — SIGNIFICANT CHANGE UP (ref 6–8)
RBC # BLD: 4.37 M/UL — LOW (ref 4.7–6.1)
RBC # FLD: 14.7 % — HIGH (ref 11.5–14.5)
SODIUM SERPL-SCNC: 142 MMOL/L — SIGNIFICANT CHANGE UP (ref 135–146)
SURGICAL PATHOLOGY STUDY: SIGNIFICANT CHANGE UP
TYPE + AB SCN PNL BLD: SIGNIFICANT CHANGE UP
VANCOMYCIN TROUGH SERPL-MCNC: 11.2 UG/ML — HIGH (ref 5–10)
WBC # BLD: 5.71 K/UL — SIGNIFICANT CHANGE UP (ref 4.8–10.8)
WBC # FLD AUTO: 5.71 K/UL — SIGNIFICANT CHANGE UP (ref 4.8–10.8)

## 2018-11-30 RX ORDER — ACETAMINOPHEN 500 MG
650 TABLET ORAL EVERY 4 HOURS
Qty: 0 | Refills: 0 | Status: DISCONTINUED | OUTPATIENT
Start: 2018-11-30 | End: 2018-12-03

## 2018-11-30 RX ORDER — MORPHINE SULFATE 50 MG/1
2 CAPSULE, EXTENDED RELEASE ORAL EVERY 4 HOURS
Qty: 0 | Refills: 0 | Status: DISCONTINUED | OUTPATIENT
Start: 2018-11-30 | End: 2018-12-03

## 2018-11-30 RX ORDER — OXYCODONE AND ACETAMINOPHEN 5; 325 MG/1; MG/1
2 TABLET ORAL ONCE
Qty: 0 | Refills: 0 | Status: DISCONTINUED | OUTPATIENT
Start: 2018-11-30 | End: 2018-11-30

## 2018-11-30 RX ORDER — ENOXAPARIN SODIUM 100 MG/ML
40 INJECTION SUBCUTANEOUS DAILY
Qty: 0 | Refills: 0 | Status: DISCONTINUED | OUTPATIENT
Start: 2018-11-30 | End: 2018-12-03

## 2018-11-30 RX ORDER — SODIUM CHLORIDE 9 MG/ML
1000 INJECTION, SOLUTION INTRAVENOUS
Qty: 0 | Refills: 0 | Status: DISCONTINUED | OUTPATIENT
Start: 2018-11-30 | End: 2018-11-30

## 2018-11-30 RX ORDER — OXYCODONE AND ACETAMINOPHEN 5; 325 MG/1; MG/1
1 TABLET ORAL EVERY 6 HOURS
Qty: 0 | Refills: 0 | Status: DISCONTINUED | OUTPATIENT
Start: 2018-11-30 | End: 2018-12-03

## 2018-11-30 RX ORDER — HYDROMORPHONE HYDROCHLORIDE 2 MG/ML
1 INJECTION INTRAMUSCULAR; INTRAVENOUS; SUBCUTANEOUS
Qty: 0 | Refills: 0 | Status: DISCONTINUED | OUTPATIENT
Start: 2018-11-30 | End: 2018-11-30

## 2018-11-30 RX ORDER — HYDROMORPHONE HYDROCHLORIDE 2 MG/ML
0.5 INJECTION INTRAMUSCULAR; INTRAVENOUS; SUBCUTANEOUS
Qty: 0 | Refills: 0 | Status: DISCONTINUED | OUTPATIENT
Start: 2018-11-30 | End: 2018-11-30

## 2018-11-30 RX ORDER — VANCOMYCIN HCL 1 G
1500 VIAL (EA) INTRAVENOUS EVERY 12 HOURS
Qty: 0 | Refills: 0 | Status: DISCONTINUED | OUTPATIENT
Start: 2018-11-30 | End: 2018-12-01

## 2018-11-30 RX ORDER — ONDANSETRON 8 MG/1
4 TABLET, FILM COATED ORAL ONCE
Qty: 0 | Refills: 0 | Status: DISCONTINUED | OUTPATIENT
Start: 2018-11-30 | End: 2018-11-30

## 2018-11-30 RX ADMIN — SODIUM CHLORIDE 100 MILLILITER(S): 9 INJECTION, SOLUTION INTRAVENOUS at 11:45

## 2018-11-30 RX ADMIN — MORPHINE SULFATE 2 MILLIGRAM(S): 50 CAPSULE, EXTENDED RELEASE ORAL at 15:34

## 2018-11-30 RX ADMIN — MORPHINE SULFATE 2 MILLIGRAM(S): 50 CAPSULE, EXTENDED RELEASE ORAL at 01:27

## 2018-11-30 RX ADMIN — MORPHINE SULFATE 2 MILLIGRAM(S): 50 CAPSULE, EXTENDED RELEASE ORAL at 21:59

## 2018-11-30 RX ADMIN — HYDROMORPHONE HYDROCHLORIDE 1 MILLIGRAM(S): 2 INJECTION INTRAMUSCULAR; INTRAVENOUS; SUBCUTANEOUS at 12:16

## 2018-11-30 RX ADMIN — Medication 100 MILLIGRAM(S): at 15:34

## 2018-11-30 RX ADMIN — Medication 300 MILLIGRAM(S): at 22:39

## 2018-11-30 RX ADMIN — HYDROMORPHONE HYDROCHLORIDE 1 MILLIGRAM(S): 2 INJECTION INTRAMUSCULAR; INTRAVENOUS; SUBCUTANEOUS at 11:45

## 2018-11-30 RX ADMIN — Medication 300 MILLIGRAM(S): at 06:18

## 2018-11-30 RX ADMIN — MORPHINE SULFATE 2 MILLIGRAM(S): 50 CAPSULE, EXTENDED RELEASE ORAL at 05:45

## 2018-11-30 RX ADMIN — Medication 100 MILLIGRAM(S): at 05:40

## 2018-11-30 NOTE — PROGRESS NOTE ADULT - ASSESSMENT
27 yo M hx of HIV not on HAART due to insurance issue (last CD4 - 600 and viral load 150K 2 months ago as per patient) and recent MRSA to his finger 3 months ago which was treated in [place], hx of syphilis presents to ED for right buttock abscess/redness/swelling and pain worsening in the past 2 days.      Right Buttock/groin abscess  - s/p I & D at bedside with purulent drainage and then in OR on 11/28/2018 and again on 11/30/218  - c/w Vanco, d/c doxycycline for now as per ID  - not concerning for necrotizing fascitis, CT scan of abdomen and pelvis with I/V con urgent was done which did not show subcut emphysema or abscess  - Burn following; possible debridement with the closure of the wound again on Monday.  - Wound Cx shows MRSA, BCx NGTD  - pain control   - ID following    HIV  - not on HAART for the past 2-3 years due to insurance issue.   - CD4 count 374/ HIV viral load pending  - ID following  - pt might qualify for housing and insurance   - will start him on therapy once he gets insurance.    Hx of Syphilis  - patient reports that he was receiving penicillin IM but has missed the last dose.   - RPR is 1:4, Called [facility] in [place], [place] for records of RPR titers and therapy, faxed them the HIPPA release form, they will fax records to us.  - ID on board, will follow up with them      DVT ppx: Lovenox SC  GI ppx: not indicated.   Diet: Regular diet, NPO after midnight   Activity: ambulates as tolerated.   Dispo: pt is homeless and needs to be setup for housing and insurance

## 2018-11-30 NOTE — PRE-OP CHECKLIST - SELECT TESTS ORDERED
INR/Hepatic Function/Results in MD note/POCT Blood Glucose/CMP/PT/PTT/Type and Screen/CXR/BMP/Urinalysis/EKG

## 2018-11-30 NOTE — CHART NOTE - NSCHARTNOTEFT_GEN_A_CORE
PACU ANESTHESIA ADMISSION NOTE      Procedure: Debridement and irrigation of wound of trunk: right groin and buttock  Abscess drainage: abscess drainage right buttock and right groin and sharp excisional debridement  [id number]    Post op diagnosis:  Wound  Abscess      ____  Intubated  TV:______       Rate: ______      FiO2: ______    _x___  Patent Airway    _x___  Full return of protective reflexes    _x___  Full recovery from anesthesia / back to baseline status    Vitals:  T(C): 36.2  HR: 63  BP: 115/75  RR: 18  SpO2: 98    Mental Status:  _x___ Awake   _____ Alert   _____ Drowsy   _____ Sedated    Nausea/Vomiting:  _x___  NO       ______Yes,   See Post - Op Orders         Pain Scale (0-10):  __0___    Treatment: _x___ None    ____ See Post - Op/PCA Orders    Post - Operative Fluids:   __x__ Oral   ____ See Post - Op Orders    Plan: Discharge:   ____Home       _x____Floor     _____Critical Care    _____  Other:_________________    Comments:  No anesthesia issues or complications noted.  Discharge when criteria met.

## 2018-11-30 NOTE — BRIEF OPERATIVE NOTE - OPERATION/FINDINGS
patchy necrotic skin  subcutaneous tissue and fascia ; small abscess pocket with abran pus lateral wound patchy necrotic skin  subcutaneous tissue and fascia ; small abscess pocket  4 x 1 cm with abran pus lateral wound

## 2018-11-30 NOTE — PROGRESS NOTE ADULT - SUBJECTIVE AND OBJECTIVE BOX
WOODY KOHLI 28y Male  MRN#: [id number]   CODE STATUS: FULL      SUBJECTIVE  Patient is a 28y old Male who presents with a chief complaint of Right buttock abscess (27 Nov 2018 16:54)  Currently admitted to medicine with the primary diagnosis of Abscess of buttock, right  Hospital course has been complicated by undergoing I and D in the ED, pain and drainage in the groin, gluteal region.  Today is hospital day 4d, and this morning he is NPO and is scheduled for debridement of the abscess.  He is afebrile now.      OBJECTIVE  PAST MEDICAL & SURGICAL HISTORY  Syphilis  HIV (human immunodeficiency virus infection)  No significant past surgical history    ALLERGIES:  Haldol (Other)    MEDICATIONS:  STANDING MEDICATIONS  clindamycin IVPB 900 milliGRAM(s) IV Intermittent every 8 hours  enoxaparin Injectable 40 milliGRAM(s) SubCutaneous daily  influenza   Vaccine 0.5 milliLiter(s) IntraMuscular once  lactated ringers. 1000 milliLiter(s) IV Continuous <Continuous>  vancomycin  IVPB 1500 milliGRAM(s) IV Intermittent every 12 hours    PRN MEDICATIONS  acetaminophen   Tablet .. 650 milliGRAM(s) Oral every 4 hours PRN  HYDROmorphone  Injectable 0.5 milliGRAM(s) IV Push every 10 minutes PRN  HYDROmorphone  Injectable 1 milliGRAM(s) IV Push every 10 minutes PRN  morphine  - Injectable 2 milliGRAM(s) IV Push every 4 hours PRN  ondansetron Injectable 4 milliGRAM(s) IV Push once PRN  oxyCODONE    5 mG/acetaminophen 325 mG 2 Tablet(s) Oral once PRN  oxyCODONE    5 mG/acetaminophen 325 mG 1 Tablet(s) Oral every 6 hours PRN      VITAL SIGNS: Last 24 Hours  T(C): 36.6 (30 Nov 2018 12:16), Max: 36.6 (30 Nov 2018 11:22)  T(F): 97.8 (30 Nov 2018 12:16), Max: 97.8 (30 Nov 2018 11:22)  HR: 60 (30 Nov 2018 12:16) (58 - 90)  BP: 100/61 (30 Nov 2018 12:16) (100/61 - 116/71)  BP(mean): --  RR: 14 (30 Nov 2018 12:16) (10 - 20)  SpO2: 100% (30 Nov 2018 12:16) (99% - 100%)    LABS:                        12.4   5.71  )-----------( 333      ( 30 Nov 2018 08:07 )             38.0     11-30    142  |  100  |  13  ----------------------------<  77  4.6   |  27  |  0.8    Ca    9.1      30 Nov 2018 08:07  Mg     2.3     11-28    TPro  7.1  /  Alb  3.6  /  TBili  <0.2  /  DBili  x   /  AST  16  /  ALT  14  /  AlkPhos  53  11-30              Culture - Surgical Swab (collected 28 Nov 2018 12:05)  Source: Exudate None  Preliminary Report (29 Nov 2018 17:57):    Moderate Staphylococcus aureus    Culture - Surgical Swab (collected 28 Nov 2018 12:05)  Source: .Surgical Swab None  Preliminary Report (29 Nov 2018 18:00):    Few Staphylococcus aureus    Culture - Blood (collected 28 Nov 2018 08:44)  Source: .Blood None  Preliminary Report (29 Nov 2018 17:01):    No growth to date.          RADIOLOGY:  < from: CT Abdomen and Pelvis w/ IV Cont (11.27.18 @ 21:28) >    IMPRESSION:   1. Diffuse subcutaneous inflammatory stranding and swelling with in right   upper medial thigh and right groin, consistent with cellulitis, without   CT evidence of abscess or subcutaneous emphysema to suggest necrotizing   fasciitis at this time. Close clinical monitoring recommended.    < end of copied text >      PHYSICAL EXAM:    GENERAL: NAD, well-developed, AAOx3  HEENT:  Atraumatic, Normocephalic. EOMI, PERRLA, conjunctiva and sclera clear, No JVD  PULMONARY: Clear to auscultation bilaterally; No wheeze  CARDIOVASCULAR: Regular rate and rhythm; No murmurs, rubs, or gallops  GASTROINTESTINAL: Soft, Nontender, Nondistended; Bowel sounds present  MUSCULOSKELETAL:  2+ Peripheral Pulses, No clubbing, cyanosis, or edema  NEUROLOGY: non-focal  SKIN: wound in the right gluteal and thigh

## 2018-11-30 NOTE — BRIEF OPERATIVE NOTE - PROCEDURE
<<-----Click on this checkbox to enter Procedure Debridement and irrigation of wound of trunk  11/30/2018  right groin and buttock  Active  [id number]  Abscess drainage  11/28/2018  abscess drainage right buttock and right groin and sharp excisional debridement  [id number]  Active  Nba Cintron Abscess drainage  11/30/2018  right groin 4 x1 cm  Active  JEAN PAUL  Debridement and irrigation of wound of trunk  11/30/2018  right groin and buttock  Active  Vicky Pereira

## 2018-11-30 NOTE — PRE-ANESTHESIA EVALUATION ADULT - NSANTHOSAYNRD_GEN_A_CORE
No. ANGE screening performed.  STOP BANG Legend: 0-2 = LOW Risk; 3-4 = INTERMEDIATE Risk; 5-8 = HIGH Risk
No. ANGE screening performed.  STOP BANG Legend: 0-2 = LOW Risk; 3-4 = INTERMEDIATE Risk; 5-8 = HIGH Risk

## 2018-11-30 NOTE — PROGRESS NOTE ADULT - SUBJECTIVE AND OBJECTIVE BOX
WOODY KOHLI  28y, Male      OVERNIGHT EVENTS:  afebrile    ROS negative except as per above    VITALS:  T(F): 95.3, Max: 96.9 (11-29-18 @ 12:59)  HR: 63  BP: 103/55  RR: 18Vital Signs Last 24 Hrs  T(C): 35.2 (30 Nov 2018 05:22), Max: 36.1 (29 Nov 2018 12:59)  T(F): 95.3 (30 Nov 2018 05:22), Max: 96.9 (29 Nov 2018 12:59)  HR: 63 (30 Nov 2018 05:22) (63 - 77)  BP: 103/55 (30 Nov 2018 05:22) (103/55 - 107/65)  BP(mean): --  RR: 18 (30 Nov 2018 05:22) (18 - 18)  SpO2: --    PHYSICAL EXAM  Gen: Awake and alert, non-toxic appearing, NAD  HEENT: NCAT. EOMI. MMM. no thrush  Neck: Supple, no cervical LAD  CV: RRR, no murmurs  Lungs: CTAB, no w/r/r  Abd: Soft. NTND  : L gluteal perineal abscess s/p I&D, induration, decreased  erythema  Extr: wwp, no edema  Skin: no rash  Neuro: No focal deficits  Lines: clean      TESTS & MEASUREMENTS:                        12.4   5.71  )-----------( 333      ( 30 Nov 2018 08:07 )             38.0     11-29    141  |  100  |  13  ----------------------------<  139<H>  4.3   |  25  |  0.7    Ca    9.2      29 Nov 2018 09:38  Mg     2.3     11-28    TPro  6.8  /  Alb  3.1<L>  /  TBili  <0.2  /  DBili  x   /  AST  14  /  ALT  11  /  AlkPhos  55  11-29    LIVER FUNCTIONS - ( 29 Nov 2018 09:38 )  Alb: 3.1 g/dL / Pro: 6.8 g/dL / ALK PHOS: 55 U/L / ALT: 11 U/L / AST: 14 U/L / GGT: x             Culture - Surgical Swab (collected 11-28-18 @ 12:05)  Source: Exudate None  Preliminary Report (11-29-18 @ 17:57):    Moderate Staphylococcus aureus    Culture - Surgical Swab (collected 11-28-18 @ 12:05)  Source: .Surgical Swab None  Preliminary Report (11-29-18 @ 18:00):    Few Staphylococcus aureus    Culture - Blood (collected 11-28-18 @ 08:44)  Source: .Blood None  Preliminary Report (11-29-18 @ 17:01):    No growth to date.    Culture - Abscess with Gram Stain (collected 11-25-18 @ 23:13)  Source: .Abscess right buttock  Preliminary Report (11-28-18 @ 18:51):    Numerous Methicillin resistant Staphylococcus aureus  Organism: Methicillin resistant Staphylococcus aureus (11-28-18 @ 18:49)  Organism: Methicillin resistant Staphylococcus aureus (11-28-18 @ 18:49)      -  Ampicillin/Sulbactam: R 16/8      -  Cefazolin: R <=4      -  Clindamycin: R >4      -  Daptomycin: S 0.5      -  Erythromycin: R >4      -  Gentamicin: S <=1 Should not be used as monotherapy      -  Linezolid: S 2      -  Oxacillin: R >2      -  Penicillin: R >8      -  RIF- Rifampin: S <=1 Should not be used as monotherapy      -  Tetra/Doxy: S <=1      -  Trimethoprim/Sulfamethoxazole: S <=0.5/9.5      -  Vancomycin: S 2      Method Type: DARRIN    Culture - Blood (collected 11-25-18 @ 22:55)  Source: .Blood Blood  Preliminary Report (11-27-18 @ 09:01):    No growth to date.          RADIOLOGY & ADDITIONAL TESTS:    ANTIBIOTICS:    clindamycin IVPB   100 mL/Hr IV Intermittent (11-28-18 @ 05:08)   100 mL/Hr IV Intermittent (11-27-18 @ 22:00)   100 mL/Hr IV Intermittent (11-27-18 @ 16:22)    clindamycin IVPB   100 mL/Hr IV Intermittent (11-27-18 @ 06:12)    clindamycin IVPB   100 mL/Hr IV Intermittent (11-30-18 @ 05:40)   100 mL/Hr IV Intermittent (11-29-18 @ 21:06)   100 mL/Hr IV Intermittent (11-29-18 @ 13:00)   100 mL/Hr IV Intermittent (11-29-18 @ 05:22)   100 mL/Hr IV Intermittent (11-28-18 @ 21:13)   100 mL/Hr IV Intermittent (11-28-18 @ 14:46)    meropenem  IVPB   100 mL/Hr IV Intermittent (11-27-18 @ 00:47)    meropenem  IVPB   100 mL/Hr IV Intermittent (11-27-18 @ 06:55)    piperacillin/tazobactam IVPB.   100 mL/Hr IV Intermittent (11-28-18 @ 05:07)   100 mL/Hr IV Intermittent (11-28-18 @ 00:33)   100 mL/Hr IV Intermittent (11-27-18 @ 18:31)    piperacillin/tazobactam IVPB.   200 mL/Hr IV Intermittent (11-26-18 @ 01:11)    vancomycin  IVPB   250 mL/Hr IV Intermittent (11-25-18 @ 23:29)    vancomycin  IVPB   166.67 mL/Hr IV Intermittent (11-28-18 @ 14:00)   166.67 mL/Hr IV Intermittent (11-27-18 @ 21:58)   166.67 mL/Hr IV Intermittent (11-27-18 @ 06:54)   166.67 mL/Hr IV Intermittent (11-26-18 @ 18:22)   166.67 mL/Hr IV Intermittent (11-26-18 @ 11:59)    vancomycin  IVPB   300 mL/Hr IV Intermittent (11-30-18 @ 06:18)   300 mL/Hr IV Intermittent (11-29-18 @ 17:55)   300 mL/Hr IV Intermittent (11-29-18 @ 05:22)   300 mL/Hr IV Intermittent (11-29-18 @ 02:56)        clindamycin IVPB 900 milliGRAM(s) IV Intermittent every 8 hours  vancomycin  IVPB 1500 milliGRAM(s) IV Intermittent every 12 hours     WOODY KOHLI  28y, Male      OVERNIGHT EVENTS:  afebrile  OR today    ROS negative except as per above    VITALS:  T(F): 95.3, Max: 96.9 (11-29-18 @ 12:59)  HR: 63  BP: 103/55  RR: 18Vital Signs Last 24 Hrs  T(C): 35.2 (30 Nov 2018 05:22), Max: 36.1 (29 Nov 2018 12:59)  T(F): 95.3 (30 Nov 2018 05:22), Max: 96.9 (29 Nov 2018 12:59)  HR: 63 (30 Nov 2018 05:22) (63 - 77)  BP: 103/55 (30 Nov 2018 05:22) (103/55 - 107/65)  BP(mean): --  RR: 18 (30 Nov 2018 05:22) (18 - 18)  SpO2: --    PHYSICAL EXAM  Gen: Awake and alert, non-toxic appearing, NAD  HEENT: NCAT. EOMI. MMM. no thrush  Neck: Supple, no cervical LAD  CV: RRR, no murmurs  Lungs: CTAB, no w/r/r  Abd: Soft. NTND  : L gluteal perineal abscess s/p I&D, induration, decreased  erythema  Extr: wwp, no edema  Skin: no rash  Neuro: No focal deficits  Lines: clean      TESTS & MEASUREMENTS:                        12.4   5.71  )-----------( 333      ( 30 Nov 2018 08:07 )             38.0     11-29    141  |  100  |  13  ----------------------------<  139<H>  4.3   |  25  |  0.7    Ca    9.2      29 Nov 2018 09:38  Mg     2.3     11-28    TPro  6.8  /  Alb  3.1<L>  /  TBili  <0.2  /  DBili  x   /  AST  14  /  ALT  11  /  AlkPhos  55  11-29    LIVER FUNCTIONS - ( 29 Nov 2018 09:38 )  Alb: 3.1 g/dL / Pro: 6.8 g/dL / ALK PHOS: 55 U/L / ALT: 11 U/L / AST: 14 U/L / GGT: x             Culture - Surgical Swab (collected 11-28-18 @ 12:05)  Source: Exudate None  Preliminary Report (11-29-18 @ 17:57):    Moderate Staphylococcus aureus    Culture - Surgical Swab (collected 11-28-18 @ 12:05)  Source: .Surgical Swab None  Preliminary Report (11-29-18 @ 18:00):    Few Staphylococcus aureus    Culture - Blood (collected 11-28-18 @ 08:44)  Source: .Blood None  Preliminary Report (11-29-18 @ 17:01):    No growth to date.    Culture - Abscess with Gram Stain (collected 11-25-18 @ 23:13)  Source: .Abscess right buttock  Preliminary Report (11-28-18 @ 18:51):    Numerous Methicillin resistant Staphylococcus aureus  Organism: Methicillin resistant Staphylococcus aureus (11-28-18 @ 18:49)  Organism: Methicillin resistant Staphylococcus aureus (11-28-18 @ 18:49)      -  Ampicillin/Sulbactam: R 16/8      -  Cefazolin: R <=4      -  Clindamycin: R >4      -  Daptomycin: S 0.5      -  Erythromycin: R >4      -  Gentamicin: S <=1 Should not be used as monotherapy      -  Linezolid: S 2      -  Oxacillin: R >2      -  Penicillin: R >8      -  RIF- Rifampin: S <=1 Should not be used as monotherapy      -  Tetra/Doxy: S <=1      -  Trimethoprim/Sulfamethoxazole: S <=0.5/9.5      -  Vancomycin: S 2      Method Type: DARRIN    Culture - Blood (collected 11-25-18 @ 22:55)  Source: .Blood Blood  Preliminary Report (11-27-18 @ 09:01):    No growth to date.          RADIOLOGY & ADDITIONAL TESTS:    ANTIBIOTICS:    clindamycin IVPB   100 mL/Hr IV Intermittent (11-28-18 @ 05:08)   100 mL/Hr IV Intermittent (11-27-18 @ 22:00)   100 mL/Hr IV Intermittent (11-27-18 @ 16:22)    clindamycin IVPB   100 mL/Hr IV Intermittent (11-27-18 @ 06:12)    clindamycin IVPB   100 mL/Hr IV Intermittent (11-30-18 @ 05:40)   100 mL/Hr IV Intermittent (11-29-18 @ 21:06)   100 mL/Hr IV Intermittent (11-29-18 @ 13:00)   100 mL/Hr IV Intermittent (11-29-18 @ 05:22)   100 mL/Hr IV Intermittent (11-28-18 @ 21:13)   100 mL/Hr IV Intermittent (11-28-18 @ 14:46)    meropenem  IVPB   100 mL/Hr IV Intermittent (11-27-18 @ 00:47)    meropenem  IVPB   100 mL/Hr IV Intermittent (11-27-18 @ 06:55)    piperacillin/tazobactam IVPB.   100 mL/Hr IV Intermittent (11-28-18 @ 05:07)   100 mL/Hr IV Intermittent (11-28-18 @ 00:33)   100 mL/Hr IV Intermittent (11-27-18 @ 18:31)    piperacillin/tazobactam IVPB.   200 mL/Hr IV Intermittent (11-26-18 @ 01:11)    vancomycin  IVPB   250 mL/Hr IV Intermittent (11-25-18 @ 23:29)    vancomycin  IVPB   166.67 mL/Hr IV Intermittent (11-28-18 @ 14:00)   166.67 mL/Hr IV Intermittent (11-27-18 @ 21:58)   166.67 mL/Hr IV Intermittent (11-27-18 @ 06:54)   166.67 mL/Hr IV Intermittent (11-26-18 @ 18:22)   166.67 mL/Hr IV Intermittent (11-26-18 @ 11:59)    vancomycin  IVPB   300 mL/Hr IV Intermittent (11-30-18 @ 06:18)   300 mL/Hr IV Intermittent (11-29-18 @ 17:55)   300 mL/Hr IV Intermittent (11-29-18 @ 05:22)   300 mL/Hr IV Intermittent (11-29-18 @ 02:56)        clindamycin IVPB 900 milliGRAM(s) IV Intermittent every 8 hours  vancomycin  IVPB 1500 milliGRAM(s) IV Intermittent every 12 hours

## 2018-12-01 LAB
ALBUMIN SERPL ELPH-MCNC: 3.6 G/DL — SIGNIFICANT CHANGE UP (ref 3.5–5.2)
ALP SERPL-CCNC: 49 U/L — SIGNIFICANT CHANGE UP (ref 30–115)
ALT FLD-CCNC: 16 U/L — SIGNIFICANT CHANGE UP (ref 0–41)
ANION GAP SERPL CALC-SCNC: 16 MMOL/L — HIGH (ref 7–14)
AST SERPL-CCNC: 15 U/L — SIGNIFICANT CHANGE UP (ref 0–41)
BASOPHILS # BLD AUTO: 0.01 K/UL — SIGNIFICANT CHANGE UP (ref 0–0.2)
BASOPHILS NFR BLD AUTO: 0.1 % — SIGNIFICANT CHANGE UP (ref 0–1)
BILIRUB SERPL-MCNC: <0.2 MG/DL — SIGNIFICANT CHANGE UP (ref 0.2–1.2)
BUN SERPL-MCNC: 16 MG/DL — SIGNIFICANT CHANGE UP (ref 10–20)
CALCIUM SERPL-MCNC: 9.1 MG/DL — SIGNIFICANT CHANGE UP (ref 8.5–10.1)
CHLORIDE SERPL-SCNC: 98 MMOL/L — SIGNIFICANT CHANGE UP (ref 98–110)
CO2 SERPL-SCNC: 25 MMOL/L — SIGNIFICANT CHANGE UP (ref 17–32)
CREAT SERPL-MCNC: 0.7 MG/DL — SIGNIFICANT CHANGE UP (ref 0.7–1.5)
CULTURE RESULTS: SIGNIFICANT CHANGE UP
CULTURE RESULTS: SIGNIFICANT CHANGE UP
EOSINOPHIL # BLD AUTO: 0.02 K/UL — SIGNIFICANT CHANGE UP (ref 0–0.7)
EOSINOPHIL NFR BLD AUTO: 0.3 % — SIGNIFICANT CHANGE UP (ref 0–8)
GAMMA INTERFERON BACKGROUND BLD IA-ACNC: 0.02 IU/ML — SIGNIFICANT CHANGE UP
GLUCOSE SERPL-MCNC: 110 MG/DL — HIGH (ref 70–99)
HCT VFR BLD CALC: 37.8 % — LOW (ref 42–52)
HGB BLD-MCNC: 12.8 G/DL — LOW (ref 14–18)
IMM GRANULOCYTES NFR BLD AUTO: 0.7 % — HIGH (ref 0.1–0.3)
LYMPHOCYTES # BLD AUTO: 2.39 K/UL — SIGNIFICANT CHANGE UP (ref 1.2–3.4)
LYMPHOCYTES # BLD AUTO: 33.9 % — SIGNIFICANT CHANGE UP (ref 20.5–51.1)
M TB IFN-G BLD-IMP: NEGATIVE — SIGNIFICANT CHANGE UP
M TB IFN-G CD4+ BCKGRND COR BLD-ACNC: 0 IU/ML — SIGNIFICANT CHANGE UP
M TB IFN-G CD4+CD8+ BCKGRND COR BLD-ACNC: 0 IU/ML — SIGNIFICANT CHANGE UP
MCHC RBC-ENTMCNC: 28.9 PG — SIGNIFICANT CHANGE UP (ref 27–31)
MCHC RBC-ENTMCNC: 33.9 G/DL — SIGNIFICANT CHANGE UP (ref 32–37)
MCV RBC AUTO: 85.3 FL — SIGNIFICANT CHANGE UP (ref 80–94)
MONOCYTES # BLD AUTO: 0.63 K/UL — HIGH (ref 0.1–0.6)
MONOCYTES NFR BLD AUTO: 8.9 % — SIGNIFICANT CHANGE UP (ref 1.7–9.3)
NEUTROPHILS # BLD AUTO: 3.96 K/UL — SIGNIFICANT CHANGE UP (ref 1.4–6.5)
NEUTROPHILS NFR BLD AUTO: 56.1 % — SIGNIFICANT CHANGE UP (ref 42.2–75.2)
NRBC # BLD: 0 /100 WBCS — SIGNIFICANT CHANGE UP (ref 0–0)
ORGANISM # SPEC MICROSCOPIC CNT: SIGNIFICANT CHANGE UP
ORGANISM # SPEC MICROSCOPIC CNT: SIGNIFICANT CHANGE UP
PLATELET # BLD AUTO: 336 K/UL — SIGNIFICANT CHANGE UP (ref 130–400)
POTASSIUM SERPL-MCNC: 4.3 MMOL/L — SIGNIFICANT CHANGE UP (ref 3.5–5)
POTASSIUM SERPL-SCNC: 4.3 MMOL/L — SIGNIFICANT CHANGE UP (ref 3.5–5)
PROT SERPL-MCNC: 7.4 G/DL — SIGNIFICANT CHANGE UP (ref 6–8)
QUANT TB PLUS MITOGEN MINUS NIL: 1.27 IU/ML — SIGNIFICANT CHANGE UP
RBC # BLD: 4.43 M/UL — LOW (ref 4.7–6.1)
RBC # FLD: 14.6 % — HIGH (ref 11.5–14.5)
SODIUM SERPL-SCNC: 139 MMOL/L — SIGNIFICANT CHANGE UP (ref 135–146)
SPECIMEN SOURCE: SIGNIFICANT CHANGE UP
SPECIMEN SOURCE: SIGNIFICANT CHANGE UP
WBC # BLD: 7.06 K/UL — SIGNIFICANT CHANGE UP (ref 4.8–10.8)
WBC # FLD AUTO: 7.06 K/UL — SIGNIFICANT CHANGE UP (ref 4.8–10.8)

## 2018-12-01 RX ORDER — GENTAMICIN SULFATE 0.1 %
1 OINTMENT (GRAM) TOPICAL
Qty: 0 | Refills: 0 | Status: DISCONTINUED | OUTPATIENT
Start: 2018-12-01 | End: 2018-12-03

## 2018-12-01 RX ORDER — VANCOMYCIN HCL 1 G
2000 VIAL (EA) INTRAVENOUS EVERY 12 HOURS
Qty: 0 | Refills: 0 | Status: DISCONTINUED | OUTPATIENT
Start: 2018-12-01 | End: 2018-12-03

## 2018-12-01 RX ORDER — MORPHINE SULFATE 50 MG/1
2 CAPSULE, EXTENDED RELEASE ORAL ONCE
Qty: 0 | Refills: 0 | Status: DISCONTINUED | OUTPATIENT
Start: 2018-12-01 | End: 2018-12-01

## 2018-12-01 RX ORDER — MORPHINE SULFATE 50 MG/1
4 CAPSULE, EXTENDED RELEASE ORAL
Qty: 0 | Refills: 0 | Status: DISCONTINUED | OUTPATIENT
Start: 2018-12-01 | End: 2018-12-03

## 2018-12-01 RX ADMIN — Medication 250 MILLIGRAM(S): at 05:14

## 2018-12-01 RX ADMIN — MORPHINE SULFATE 2 MILLIGRAM(S): 50 CAPSULE, EXTENDED RELEASE ORAL at 11:38

## 2018-12-01 RX ADMIN — OXYCODONE AND ACETAMINOPHEN 1 TABLET(S): 5; 325 TABLET ORAL at 01:20

## 2018-12-01 RX ADMIN — MORPHINE SULFATE 2 MILLIGRAM(S): 50 CAPSULE, EXTENDED RELEASE ORAL at 09:14

## 2018-12-01 RX ADMIN — MORPHINE SULFATE 2 MILLIGRAM(S): 50 CAPSULE, EXTENDED RELEASE ORAL at 05:09

## 2018-12-01 RX ADMIN — Medication 1 TABLET(S): at 22:18

## 2018-12-01 RX ADMIN — MORPHINE SULFATE 2 MILLIGRAM(S): 50 CAPSULE, EXTENDED RELEASE ORAL at 09:30

## 2018-12-01 RX ADMIN — Medication 250 MILLIGRAM(S): at 17:18

## 2018-12-01 RX ADMIN — MORPHINE SULFATE 2 MILLIGRAM(S): 50 CAPSULE, EXTENDED RELEASE ORAL at 12:04

## 2018-12-01 RX ADMIN — MORPHINE SULFATE 2 MILLIGRAM(S): 50 CAPSULE, EXTENDED RELEASE ORAL at 17:24

## 2018-12-01 NOTE — PROGRESS NOTE ADULT - SUBJECTIVE AND OBJECTIVE BOX
Patient is a 28y old  Male who presents with a chief complaint of Right buttock abscess (01 Dec 2018 11:32)    No acute events overnight. POD1 s/p I&D    Vital Signs Last 24 Hrs  T(C): 35.7 (01 Dec 2018 05:09), Max: 36.1 (30 Nov 2018 20:51)  T(F): 96.3 (01 Dec 2018 05:09), Max: 96.9 (30 Nov 2018 20:51)  HR: 50 (01 Dec 2018 05:09) (50 - 75)  BP: 99/56 (01 Dec 2018 05:09) (99/56 - 124/66)  BP(mean): --  RR: 18 (30 Nov 2018 20:51) (18 - 18)  SpO2: 99% (01 Dec 2018 00:30) (99% - 99%)    I&O's Summary    30 Nov 2018 07:01  -  01 Dec 2018 07:00  --------------------------------------------------------  IN: 0 mL / OUT: 450 mL / NET: -450 mL        Meds:  MEDICATIONS  (STANDING):  enoxaparin Injectable 40 milliGRAM(s) SubCutaneous daily  influenza   Vaccine 0.5 milliLiter(s) IntraMuscular once  vancomycin  IVPB 2000 milliGRAM(s) IV Intermittent every 12 hours    MEDICATIONS  (PRN):  acetaminophen   Tablet .. 650 milliGRAM(s) Oral every 4 hours PRN Temp greater or equal to 38C (100.4F)  gentamicin 0.1% Ointment 1 Application(s) Topical two times a day PRN wound care  morphine  - Injectable 4 milliGRAM(s) IV Push two times a day PRN wound care  morphine  - Injectable 2 milliGRAM(s) IV Push every 4 hours PRN Severe Pain (7 - 10)  oxyCODONE    5 mG/acetaminophen 325 mG 1 Tablet(s) Oral every 6 hours PRN Severe Pain (7 - 10)          Labs:                        12.8   7.06  )-----------( 336      ( 01 Dec 2018 08:37 )             37.8     12-01    139  |  98  |  16  ----------------------------<  110<H>  4.3   |  25  |  0.7    Ca    9.1      01 Dec 2018 08:37    TPro  7.4  /  Alb  3.6  /  TBili  <0.2  /  DBili  x   /  AST  15  /  ALT  16  /  AlkPhos  49  12-01    PE: AAO x 3    Full thickness wound to right groin with granulation tissue, decreased swelling and erythema, serosang dc

## 2018-12-01 NOTE — PROGRESS NOTE ADULT - ASSESSMENT
27 yo M hx of HIV not on HAART due to insurance issue (last CD4 - 600 and viral load 150K 2 months ago as per patient) and recent MRSA to his finger 3 months ago which was treated in [place], hx of syphilis presents to ED for right buttock abscess/redness/swelling and pain worsening in the past 2 days.      Right Buttock/groin abscess  - s/p I & D at bedside with purulent drainage and then in OR with burn on 11/28/2018 and again in OR on 11/30/218  - c/w Vanco, d/c doxycycline for now as per ID  - not concerning for necrotizing fascitis, CT scan of abdomen and pelvis with I/V con urgent was done which did not show subcut emphysema or abscess  - Burn following; possible debridement with the closure of the wound again on Monday.  - Wound Cx shows MRSA, BCx NGTD  - increased vanc to 2 gm BID as trough was 11, will get another trough today in pm and decide on next dose  - pain controlled   - ID following    HIV  - not on HAART for the past 2-3 years due to insurance issue.   - CD4 count 374/ HIV viral load pending  - ID following  - pt might qualify for housing and insurance   - will start him on therapy once he gets insurance.    Hx of Syphilis  - patient reports that he was receiving penicillin IM but has missed the last dose.   - RPR is 1:4, Called [facility] in [place], [place] for records of RPR titers and therapy, faxed them the HIPPA release form, they will fax records to us.  - ID on board, will follow up with them      DVT ppx: Lovenox SC  GI ppx: not indicated.   Diet: Regular diet, NPO after midnight on Sunday  Activity: ambulates as tolerated.   Dispo: pt is homeless and needs to be setup for housing and insurance 29 yo M hx of HIV not on HAART due to insurance issue (last CD4 - 600 and viral load 150K 2 months ago as per patient) and recent MRSA to his finger 3 months ago which was treated in [place], hx of syphilis presents to ED for right buttock abscess/redness/swelling and pain worsening in the past 2 days.      Right Buttock/groin abscess  - s/p I & D at bedside with purulent drainage and then in OR with burn on 11/28/2018 and again in OR on 11/30/218  - c/w Vanco, d/c doxycycline for now as per ID  - not concerning for necrotizing fascitis, CT scan of abdomen and pelvis with I/V con urgent was done which did not show subcut emphysema or abscess  - Burn following; possible debridement with the closure of the wound again on Monday.  - Wound Cx shows MRSA, BCx NGTD  - increased vanc to 2 gm BID as trough was 11, will get another trough today in pm and decide on next dose  - pain controlled   - ID following    HIV  - not on HAART for the past 2-3 years due to insurance issue.   - CD4 count 374/ HIV viral load 57394 copies/ml and 4.02 lg copy/ml  - ID following  - pt might qualify for housing and insurance   - will start him on therapy once he gets insurance.    Hx of Syphilis  - patient reports that he was receiving penicillin IM but has missed the last dose.   - RPR is 1:4, Called [facility] in [place], [place] for records of RPR titers and therapy, faxed them the HIPPA release form, they will fax records to us.  - ID on board, will follow up with them      DVT ppx: Lovenox SC  GI ppx: not indicated.   Diet: Regular diet, NPO after midnight on Sunday  Activity: ambulates as tolerated.   Dispo: pt is homeless and needs to be setup for housing and insurance

## 2018-12-01 NOTE — PROGRESS NOTE ADULT - ASSESSMENT
28M    Asymptomatic HIV  Recent MRSA skin infection of the finger  # L groin/cellulitis/abscess- s/p I&D- 11/29, 11/30- MRSA    Plan:   1. Add Bactrim  with vancomycin  2. Adjust vancomycin doses since level is no therapeutic, Goal level is 15 to 20    #HIV CD4 375; 29%  - Previously on Genvoya, off for 2 years  - Dx 2016 [place] in [place]  - Needs insurance, housing  - If insurance confirmed, restart Genvoya  - f/u VL  - Send quantiferon gold  - STI screen urine and rectum for G/C    #Syphilis- reports 2/3 IM PCN  - TiTer 1:4  - Call [facility] in [place], [place] for records of RPR titers and therapy 28M    # Asymptomatic HIV  # Recent MRSA skin infection of the finger  # L groin/cellulitis/abscess- s/p I&D- 11/29, 11/30- MRSA    Plan:  1. Add Bactrim  with vancomycin  2. Adjust vancomycin doses since level is no therapeutic, Goal level is 15 to 20    #HIV CD4 375; 29%  - Previously on Genvoya, off for 2 years  - Dx 2016 while in longterm  - Needs insurance, housing  - If insurance confirmed, restart Genvoya  - f/u VL  - Send quantiferon gold  - STI screen urine and rectum for G/C    #Syphilis- reports 2/3 IM PCN  - TiTer 1:4  - Call [facility] in [place], [place] for records of RPR titers and therapy

## 2018-12-01 NOTE — PROGRESS NOTE ADULT - SUBJECTIVE AND OBJECTIVE BOX
Patient is seen and examined at the bed side, is afebrile.      REVIEW OF SYSTEMS:      Vital Signs Last 24 Hrs  T(C): 35.7 (01 Dec 2018 05:09), Max: 36.1 (30 Nov 2018 20:51)  T(F): 96.3 (01 Dec 2018 05:09), Max: 96.9 (30 Nov 2018 20:51)  HR: 50 (01 Dec 2018 05:09) (50 - 75)  BP: 99/56 (01 Dec 2018 05:09) (99/56 - 124/66)  BP(mean): --  RR: 18 (30 Nov 2018 20:51) (18 - 18)  SpO2: 99% (01 Dec 2018 00:30) (99% - 99%)      PHYSICAL EXAM:  GENERAL: Not in distress  CHEST/LUNG: Clear to percussion bilaterally; No rales, rhonchi, wheezing, or rubs  HEART: Regular rate and rhythm; No murmurs, rubs, or gallops  ABDOMEN: Soft, Nontender, Nondistended; Bowel sounds present  EXTREMITIES:  2+ Peripheral Pulses, No clubbing, cyanosis, or edema  CNS: Awake and alert          MEDICATIONS  (STANDING):  enoxaparin Injectable 40 milliGRAM(s) SubCutaneous daily  influenza   Vaccine 0.5 milliLiter(s) IntraMuscular once  vancomycin  IVPB 2000 milliGRAM(s) IV Intermittent every 12 hours    MEDICATIONS  (PRN):  acetaminophen   Tablet .. 650 milliGRAM(s) Oral every 4 hours PRN Temp greater or equal to 38C (100.4F)  gentamicin 0.1% Ointment 1 Application(s) Topical two times a day PRN wound care  morphine  - Injectable 4 milliGRAM(s) IV Push two times a day PRN wound care  morphine  - Injectable 2 milliGRAM(s) IV Push every 4 hours PRN Severe Pain (7 - 10)  oxyCODONE    5 mG/acetaminophen 325 mG 1 Tablet(s) Oral every 6 hours PRN Severe Pain (7 - 10)      Allergies    Haldol (Other)        LABS:                        12.8   7.06  )-----------( 336      ( 01 Dec 2018 08:37 )             37.8     12-01    139  |  98  |  16  ----------------------------<  110<H>  4.3   |  25  |  0.7    Ca    9.1      01 Dec 2018 08:37    TPro  7.4  /  Alb  3.6  /  TBili  <0.2  /  DBili  x   /  AST  15  /  ALT  16  /  AlkPhos  49  12-01        RADIOLOGY & ADDITIONAL TESTS:      MICROBIOLOGY DATA:    Culture - Surgical Swab (11.30.18 @ 13:11)    Specimen Source: .Surgical Swab None    Culture Results:   No growth    Culture - Surgical Swab (11.28.18 @ 12:05)    -  Ampicillin/Sulbactam: R 16/8    -  Cefazolin: R <=4    -  Clindamycin: R >4    -  Daptomycin: S 0.5    -  Erythromycin: R >4    -  Gentamicin: S <=1 Should not be used as monotherapy    -  Linezolid: S 2    -  Oxacillin: R >2    -  Penicillin: R >8    -  RIF- Rifampin: S <=1 Should not be used as monotherapy    -  Tetra/Doxy: S <=1    -  Trimethoprim/Sulfamethoxazole: S <=0.5/9.5    -  Vancomycin: S 2    Specimen Source: Exudate None    Culture Results:   Moderate Methicillin resistant Staphylococcus aureus    Organism Identification: Methicillin resistant Staphylococcus aureus    Organism: Methicillin resistant Staphylococcus aureus    Method Type: DARRIN Patient is seen and examined at the bed side, is afebrile.      REVIEW OF SYSTEMS: All other review systems are negative        Vital Signs Last 24 Hrs  T(C): 35.7 (01 Dec 2018 05:09), Max: 36.1 (30 Nov 2018 20:51)  T(F): 96.3 (01 Dec 2018 05:09), Max: 96.9 (30 Nov 2018 20:51)  HR: 50 (01 Dec 2018 05:09) (50 - 75)  BP: 99/56 (01 Dec 2018 05:09) (99/56 - 124/66)  BP(mean): --  RR: 18 (30 Nov 2018 20:51) (18 - 18)  SpO2: 99% (01 Dec 2018 00:30) (99% - 99%)      PHYSICAL EXAM:  GENERAL: Not in distress  CHEST/LUNG:  air entry bilaterally  HEART: s1 and s2 present  ABDOMEN:  Nontender and Nondistended  EXTREMITIES: No pedal edema  CNS: Awake and alert          MEDICATIONS  (STANDING):  enoxaparin Injectable 40 milliGRAM(s) SubCutaneous daily  influenza   Vaccine 0.5 milliLiter(s) IntraMuscular once  vancomycin  IVPB 2000 milliGRAM(s) IV Intermittent every 12 hours    MEDICATIONS  (PRN):  acetaminophen   Tablet .. 650 milliGRAM(s) Oral every 4 hours PRN Temp greater or equal to 38C (100.4F)  gentamicin 0.1% Ointment 1 Application(s) Topical two times a day PRN wound care  morphine  - Injectable 4 milliGRAM(s) IV Push two times a day PRN wound care  morphine  - Injectable 2 milliGRAM(s) IV Push every 4 hours PRN Severe Pain (7 - 10)  oxyCODONE    5 mG/acetaminophen 325 mG 1 Tablet(s) Oral every 6 hours PRN Severe Pain (7 - 10)      Allergies    Haldol (Other)        LABS:                        12.8   7.06  )-----------( 336      ( 01 Dec 2018 08:37 )             37.8       12-01    139  |  98  |  16  ----------------------------<  110<H>  4.3   |  25  |  0.7    Ca    9.1      01 Dec 2018 08:37    TPro  7.4  /  Alb  3.6  /  TBili  <0.2  /  DBili  x   /  AST  15  /  ALT  16  /  AlkPhos  49  12-01        RADIOLOGY & ADDITIONAL TESTS:      MICROBIOLOGY DATA:    Culture - Surgical Swab (11.30.18 @ 13:11)    Specimen Source: .Surgical Swab None    Culture Results:   No growth    Culture - Surgical Swab (11.28.18 @ 12:05)    -  Ampicillin/Sulbactam: R 16/8    -  Cefazolin: R <=4    -  Clindamycin: R >4    -  Daptomycin: S 0.5    -  Erythromycin: R >4    -  Gentamicin: S <=1 Should not be used as monotherapy    -  Linezolid: S 2    -  Oxacillin: R >2    -  Penicillin: R >8    -  RIF- Rifampin: S <=1 Should not be used as monotherapy    -  Tetra/Doxy: S <=1    -  Trimethoprim/Sulfamethoxazole: S <=0.5/9.5    -  Vancomycin: S 2    Specimen Source: Exudate None    Culture Results:   Moderate Methicillin resistant Staphylococcus aureus    Organism Identification: Methicillin resistant Staphylococcus aureus    Organism: Methicillin resistant Staphylococcus aureus    Method Type: DARRIN

## 2018-12-01 NOTE — PROGRESS NOTE ADULT - SUBJECTIVE AND OBJECTIVE BOX
WOODY KOHLI 28y Male  MRN#: [id number]   CODE STATUS: FULL      SUBJECTIVE  Patient is a 28y old Male who presents with a chief complaint of Right buttock abscess (27 Nov 2018 16:54)  Currently admitted to medicine with the primary diagnosis of Abscess of buttock, right  Hospital course has been complicated by undergoing I and D in the ED, pain and drainage in the groin, gluteal region.  Today is hospital day 5d, and this morning he is s/p debridement 11/30/2018 and is not having any active complaints.  He is afebrile now.        OBJECTIVE  PAST MEDICAL & SURGICAL HISTORY  Syphilis  HIV (human immunodeficiency virus infection)  No significant past surgical history    ALLERGIES:  Haldol (Other)    MEDICATIONS:  STANDING MEDICATIONS  enoxaparin Injectable 40 milliGRAM(s) SubCutaneous daily  influenza   Vaccine 0.5 milliLiter(s) IntraMuscular once  vancomycin  IVPB 2000 milliGRAM(s) IV Intermittent every 12 hours    PRN MEDICATIONS  acetaminophen   Tablet .. 650 milliGRAM(s) Oral every 4 hours PRN  morphine  - Injectable 2 milliGRAM(s) IV Push every 4 hours PRN  oxyCODONE    5 mG/acetaminophen 325 mG 1 Tablet(s) Oral every 6 hours PRN      VITAL SIGNS: Last 24 Hours  T(C): 35.7 (01 Dec 2018 05:09), Max: 36.6 (30 Nov 2018 12:16)  T(F): 96.3 (01 Dec 2018 05:09), Max: 97.8 (30 Nov 2018 12:16)  HR: 50 (01 Dec 2018 05:09) (50 - 75)  BP: 99/56 (01 Dec 2018 05:09) (99/56 - 124/66)  BP(mean): --  RR: 18 (30 Nov 2018 20:51) (10 - 18)  SpO2: 99% (01 Dec 2018 00:30) (99% - 100%)    LABS:                        12.8   7.06  )-----------( 336      ( 01 Dec 2018 08:37 )             37.8     12-01    139  |  98  |  16  ----------------------------<  110<H>  4.3   |  25  |  0.7    Ca    9.1      01 Dec 2018 08:37    TPro  7.4  /  Alb  3.6  /  TBili  <0.2  /  DBili  x   /  AST  15  /  ALT  16  /  AlkPhos  49  12-01              Culture - Surgical Swab (collected 28 Nov 2018 12:05)  Source: Exudate None  Preliminary Report (30 Nov 2018 18:07):    Moderate Methicillin resistant Staphylococcus aureus  Organism: Methicillin resistant Staphylococcus aureus (30 Nov 2018 18:06)  Organism: Methicillin resistant Staphylococcus aureus (30 Nov 2018 18:06)    Culture - Surgical Swab (collected 28 Nov 2018 12:05)  Source: .Surgical Swab None  Preliminary Report (30 Nov 2018 18:05):    Few Methicillin resistant Staphylococcus aureus  Organism: Methicillin resistant Staphylococcus aureus (30 Nov 2018 18:04)  Organism: Methicillin resistant Staphylococcus aureus (30 Nov 2018 18:04)          RADIOLOGY:  < from: CT Abdomen and Pelvis w/ IV Cont (11.27.18 @ 21:28) >    IMPRESSION:   1. Diffuse subcutaneous inflammatory stranding and swelling with in right   upper medial thigh and right groin, consistent with cellulitis, without   CT evidence of abscess or subcutaneous emphysema to suggest necrotizing   fasciitis at this time. Close clinical monitoring recommended.    < end of copied text >      PHYSICAL EXAM:    GENERAL: NAD, well-developed, AAOx3  HEENT:  Atraumatic, Normocephalic. EOMI, PERRLA, conjunctiva and sclera clear, No JVD  PULMONARY: Clear to auscultation bilaterally; No wheeze  CARDIOVASCULAR: Regular rate and rhythm; No murmurs, rubs, or gallops  GASTROINTESTINAL: Soft, Nontender, Nondistended; Bowel sounds present  MUSCULOSKELETAL:  2+ Peripheral Pulses, No clubbing, cyanosis, or edema  NEUROLOGY: non-focal  SKIN: wound in the right gluteal and thigh

## 2018-12-01 NOTE — PROGRESS NOTE ADULT - ASSESSMENT
A/P: s/p I & D right groin    cont wound care  Cont IV antibx  DVT GI Prophylaxis  Pain control  OT/PT  f/u cx

## 2018-12-02 LAB
ANION GAP SERPL CALC-SCNC: 15 MMOL/L — HIGH (ref 7–14)
BLD GP AB SCN SERPL QL: SIGNIFICANT CHANGE UP
BUN SERPL-MCNC: 25 MG/DL — HIGH (ref 10–20)
CALCIUM SERPL-MCNC: 9 MG/DL — SIGNIFICANT CHANGE UP (ref 8.5–10.1)
CHLORIDE SERPL-SCNC: 96 MMOL/L — LOW (ref 98–110)
CO2 SERPL-SCNC: 27 MMOL/L — SIGNIFICANT CHANGE UP (ref 17–32)
CREAT SERPL-MCNC: 1 MG/DL — SIGNIFICANT CHANGE UP (ref 0.7–1.5)
GLUCOSE SERPL-MCNC: 86 MG/DL — SIGNIFICANT CHANGE UP (ref 70–99)
HCT VFR BLD CALC: 41.9 % — LOW (ref 42–52)
HGB BLD-MCNC: 13.9 G/DL — LOW (ref 14–18)
MAGNESIUM SERPL-MCNC: 2 MG/DL — SIGNIFICANT CHANGE UP (ref 1.8–2.4)
MCHC RBC-ENTMCNC: 28.6 PG — SIGNIFICANT CHANGE UP (ref 27–31)
MCHC RBC-ENTMCNC: 33.2 G/DL — SIGNIFICANT CHANGE UP (ref 32–37)
MCV RBC AUTO: 86.2 FL — SIGNIFICANT CHANGE UP (ref 80–94)
NRBC # BLD: 0 /100 WBCS — SIGNIFICANT CHANGE UP (ref 0–0)
PHOSPHATE SERPL-MCNC: 4.6 MG/DL — SIGNIFICANT CHANGE UP (ref 2.1–4.9)
PLATELET # BLD AUTO: 355 K/UL — SIGNIFICANT CHANGE UP (ref 130–400)
POTASSIUM SERPL-MCNC: 4.5 MMOL/L — SIGNIFICANT CHANGE UP (ref 3.5–5)
POTASSIUM SERPL-SCNC: 4.5 MMOL/L — SIGNIFICANT CHANGE UP (ref 3.5–5)
RBC # BLD: 4.86 M/UL — SIGNIFICANT CHANGE UP (ref 4.7–6.1)
RBC # FLD: 14.3 % — SIGNIFICANT CHANGE UP (ref 11.5–14.5)
SODIUM SERPL-SCNC: 138 MMOL/L — SIGNIFICANT CHANGE UP (ref 135–146)
TYPE + AB SCN PNL BLD: SIGNIFICANT CHANGE UP
WBC # BLD: 6.17 K/UL — SIGNIFICANT CHANGE UP (ref 4.8–10.8)
WBC # FLD AUTO: 6.17 K/UL — SIGNIFICANT CHANGE UP (ref 4.8–10.8)

## 2018-12-02 RX ADMIN — MORPHINE SULFATE 2 MILLIGRAM(S): 50 CAPSULE, EXTENDED RELEASE ORAL at 22:03

## 2018-12-02 RX ADMIN — Medication 250 MILLIGRAM(S): at 09:19

## 2018-12-02 RX ADMIN — Medication 1 TABLET(S): at 09:17

## 2018-12-02 RX ADMIN — MORPHINE SULFATE 2 MILLIGRAM(S): 50 CAPSULE, EXTENDED RELEASE ORAL at 16:58

## 2018-12-02 RX ADMIN — OXYCODONE AND ACETAMINOPHEN 1 TABLET(S): 5; 325 TABLET ORAL at 11:59

## 2018-12-02 RX ADMIN — OXYCODONE AND ACETAMINOPHEN 1 TABLET(S): 5; 325 TABLET ORAL at 13:00

## 2018-12-02 RX ADMIN — MORPHINE SULFATE 2 MILLIGRAM(S): 50 CAPSULE, EXTENDED RELEASE ORAL at 22:42

## 2018-12-02 RX ADMIN — MORPHINE SULFATE 2 MILLIGRAM(S): 50 CAPSULE, EXTENDED RELEASE ORAL at 17:15

## 2018-12-02 RX ADMIN — Medication 1 TABLET(S): at 17:05

## 2018-12-02 RX ADMIN — Medication 250 MILLIGRAM(S): at 17:04

## 2018-12-02 RX ADMIN — MORPHINE SULFATE 2 MILLIGRAM(S): 50 CAPSULE, EXTENDED RELEASE ORAL at 09:35

## 2018-12-02 RX ADMIN — MORPHINE SULFATE 2 MILLIGRAM(S): 50 CAPSULE, EXTENDED RELEASE ORAL at 09:13

## 2018-12-02 NOTE — PROGRESS NOTE ADULT - ASSESSMENT
28M    # Asymptomatic HIV  # Recent MRSA skin infection of the finger  # L groin/cellulitis/abscess- s/p I&D- 11/29, 11/30- MRSA    Plan:  1. Continue Bactrim and  vancomycin  2. Monitor and keep vancomycin level between 15 to 20    #HIV CD4 375; 29%  - Previously on Genvoya, off for 2 years  - Dx 2016 while in intermediate  - Needs insurance, housing  - If insurance confirmed, restart Genvoya  - f/u VL  - Send quantiferon gold  - STI screen urine and rectum for G/C    #Syphilis- reports 2/3 IM PCN  - TiTer 1:4  - Call [facility] in [place], [place] for records of RPR titers and therapy

## 2018-12-02 NOTE — PROGRESS NOTE ADULT - SUBJECTIVE AND OBJECTIVE BOX
Patient is seen and examined at the bed side, is afebrile.      REVIEW OF SYSTEMS: All other review systems are negative      Vital Signs Last 24 Hrs  T(C): 36.2 (02 Dec 2018 13:03), Max: 36.6 (01 Dec 2018 21:01)  T(F): 97.1 (02 Dec 2018 13:03), Max: 97.9 (01 Dec 2018 21:01)  HR: 76 (02 Dec 2018 13:03) (72 - 76)  BP: 93/60 (02 Dec 2018 13:03) (93/60 - 106/66)  BP(mean): --  RR: 19 (02 Dec 2018 13:03) (18 - 19)  SpO2: 99% (02 Dec 2018 08:37) (99% - 99%)      PHYSICAL EXAM:  GENERAL: Not in distress  CHEST/LUNG:  air entry bilaterally  HEART: s1 and s2 present  ABDOMEN:  Nontender and Nondistended  EXTREMITIES: No pedal edema  CNS: Awake and alert        MEDICATIONS  (STANDING):  enoxaparin Injectable 40 milliGRAM(s) SubCutaneous daily  influenza   Vaccine 0.5 milliLiter(s) IntraMuscular once  trimethoprim  160 mG/sulfamethoxazole 800 mG 1 Tablet(s) Oral every 12 hours  vancomycin  IVPB 2000 milliGRAM(s) IV Intermittent every 12 hours    MEDICATIONS  (PRN):  acetaminophen   Tablet .. 650 milliGRAM(s) Oral every 4 hours PRN Temp greater or equal to 38C (100.4F)  gentamicin 0.1% Ointment 1 Application(s) Topical two times a day PRN wound care  morphine  - Injectable 4 milliGRAM(s) IV Push two times a day PRN wound care  morphine  - Injectable 2 milliGRAM(s) IV Push every 4 hours PRN Severe Pain (7 - 10)  oxyCODONE    5 mG/acetaminophen 325 mG 1 Tablet(s) Oral every 6 hours PRN Severe Pain (7 - 10)        Allergies    Haldol (Other)        LABS: No new Labs                        12.8   7.06  )-----------( 336      ( 01 Dec 2018 08:37 )             37.8         12-01    139  |  98  |  16  ----------------------------<  110<H>  4.3   |  25  |  0.7    Ca    9.1      01 Dec 2018 08:37    TPro  7.4  /  Alb  3.6  /  TBili  <0.2  /  DBili  x   /  AST  15  /  ALT  16  /  AlkPhos  49  12-01        RADIOLOGY & ADDITIONAL TESTS:      MICROBIOLOGY DATA:    Culture - Surgical Swab (11.30.18 @ 13:11)    Specimen Source: .Surgical Swab None    Culture Results:   No growth    Culture - Surgical Swab (11.28.18 @ 12:05)    -  Ampicillin/Sulbactam: R 16/8    -  Cefazolin: R <=4    -  Clindamycin: R >4    -  Daptomycin: S 0.5    -  Erythromycin: R >4    -  Gentamicin: S <=1 Should not be used as monotherapy    -  Linezolid: S 2    -  Oxacillin: R >2    -  Penicillin: R >8    -  RIF- Rifampin: S <=1 Should not be used as monotherapy    -  Tetra/Doxy: S <=1    -  Trimethoprim/Sulfamethoxazole: S <=0.5/9.5    -  Vancomycin: S 2    Specimen Source: Exudate None    Culture Results:   Moderate Methicillin resistant Staphylococcus aureus    Organism Identification: Methicillin resistant Staphylococcus aureus    Organism: Methicillin resistant Staphylococcus aureus    Method Type: DARRIN Patient is seen and examined at the bed side, is afebrile.  No new complaints.         REVIEW OF SYSTEMS: All other review systems are negative      Vital Signs Last 24 Hrs  T(C): 36.2 (02 Dec 2018 13:03), Max: 36.6 (01 Dec 2018 21:01)  T(F): 97.1 (02 Dec 2018 13:03), Max: 97.9 (01 Dec 2018 21:01)  HR: 76 (02 Dec 2018 13:03) (72 - 76)  BP: 93/60 (02 Dec 2018 13:03) (93/60 - 106/66)  BP(mean): --  RR: 19 (02 Dec 2018 13:03) (18 - 19)  SpO2: 99% (02 Dec 2018 08:37) (99% - 99%)        PHYSICAL EXAM:  GENERAL: Not in distress  CHEST/LUNG:  air entry bilaterally  HEART: s1 and s2 present  ABDOMEN:  Nontender and Nondistended  EXTREMITIES: No pedal edema  CNS: Awake and alert        MEDICATIONS  (STANDING):  enoxaparin Injectable 40 milliGRAM(s) SubCutaneous daily  influenza   Vaccine 0.5 milliLiter(s) IntraMuscular once  trimethoprim  160 mG/sulfamethoxazole 800 mG 1 Tablet(s) Oral every 12 hours  vancomycin  IVPB 2000 milliGRAM(s) IV Intermittent every 12 hours    MEDICATIONS  (PRN):  acetaminophen   Tablet .. 650 milliGRAM(s) Oral every 4 hours PRN Temp greater or equal to 38C (100.4F)  gentamicin 0.1% Ointment 1 Application(s) Topical two times a day PRN wound care  morphine  - Injectable 4 milliGRAM(s) IV Push two times a day PRN wound care  morphine  - Injectable 2 milliGRAM(s) IV Push every 4 hours PRN Severe Pain (7 - 10)  oxyCODONE    5 mG/acetaminophen 325 mG 1 Tablet(s) Oral every 6 hours PRN Severe Pain (7 - 10)        Allergies    Haldol (Other)        LABS: No new Labs                        12.8   7.06  )-----------( 336      ( 01 Dec 2018 08:37 )             37.8         12-01    139  |  98  |  16  ----------------------------<  110<H>  4.3   |  25  |  0.7    Ca    9.1      01 Dec 2018 08:37    TPro  7.4  /  Alb  3.6  /  TBili  <0.2  /  DBili  x   /  AST  15  /  ALT  16  /  AlkPhos  49  12-01        RADIOLOGY & ADDITIONAL TESTS:      MICROBIOLOGY DATA:    Culture - Surgical Swab (11.30.18 @ 13:11)    Specimen Source: .Surgical Swab None    Culture Results:   No growth    Culture - Surgical Swab (11.28.18 @ 12:05)    -  Ampicillin/Sulbactam: R 16/8    -  Cefazolin: R <=4    -  Clindamycin: R >4    -  Daptomycin: S 0.5    -  Erythromycin: R >4    -  Gentamicin: S <=1 Should not be used as monotherapy    -  Linezolid: S 2    -  Oxacillin: R >2    -  Penicillin: R >8    -  RIF- Rifampin: S <=1 Should not be used as monotherapy    -  Tetra/Doxy: S <=1    -  Trimethoprim/Sulfamethoxazole: S <=0.5/9.5    -  Vancomycin: S 2    Specimen Source: Exudate None    Culture Results:   Moderate Methicillin resistant Staphylococcus aureus    Organism Identification: Methicillin resistant Staphylococcus aureus    Organism: Methicillin resistant Staphylococcus aureus    Method Type: DARRIN

## 2018-12-03 LAB
CULTURE RESULTS: SIGNIFICANT CHANGE UP
SPECIMEN SOURCE: SIGNIFICANT CHANGE UP
VANCOMYCIN TROUGH SERPL-MCNC: 21.1 UG/ML — HIGH (ref 5–10)

## 2018-12-03 RX ORDER — DOCUSATE SODIUM 100 MG
100 CAPSULE ORAL
Qty: 0 | Refills: 0 | Status: DISCONTINUED | OUTPATIENT
Start: 2018-12-03 | End: 2018-12-06

## 2018-12-03 RX ORDER — VANCOMYCIN HCL 1 G
1500 VIAL (EA) INTRAVENOUS EVERY 12 HOURS
Qty: 0 | Refills: 0 | Status: DISCONTINUED | OUTPATIENT
Start: 2018-12-03 | End: 2018-12-06

## 2018-12-03 RX ORDER — SODIUM CHLORIDE 9 MG/ML
1000 INJECTION, SOLUTION INTRAVENOUS
Qty: 0 | Refills: 0 | Status: DISCONTINUED | OUTPATIENT
Start: 2018-12-03 | End: 2018-12-03

## 2018-12-03 RX ORDER — ACETAMINOPHEN 500 MG
650 TABLET ORAL EVERY 4 HOURS
Qty: 0 | Refills: 0 | Status: DISCONTINUED | OUTPATIENT
Start: 2018-12-03 | End: 2018-12-06

## 2018-12-03 RX ORDER — ENOXAPARIN SODIUM 100 MG/ML
40 INJECTION SUBCUTANEOUS DAILY
Qty: 0 | Refills: 0 | Status: DISCONTINUED | OUTPATIENT
Start: 2018-12-03 | End: 2018-12-06

## 2018-12-03 RX ORDER — HYDROMORPHONE HYDROCHLORIDE 2 MG/ML
0.5 INJECTION INTRAMUSCULAR; INTRAVENOUS; SUBCUTANEOUS
Qty: 0 | Refills: 0 | Status: DISCONTINUED | OUTPATIENT
Start: 2018-12-03 | End: 2018-12-03

## 2018-12-03 RX ORDER — GENTAMICIN SULFATE 0.1 %
1 OINTMENT (GRAM) TOPICAL
Qty: 0 | Refills: 0 | Status: DISCONTINUED | OUTPATIENT
Start: 2018-12-03 | End: 2018-12-06

## 2018-12-03 RX ORDER — VANCOMYCIN HCL 1 G
1500 VIAL (EA) INTRAVENOUS EVERY 12 HOURS
Qty: 0 | Refills: 0 | Status: DISCONTINUED | OUTPATIENT
Start: 2018-12-03 | End: 2018-12-03

## 2018-12-03 RX ORDER — OXYCODONE AND ACETAMINOPHEN 5; 325 MG/1; MG/1
1 TABLET ORAL EVERY 6 HOURS
Qty: 0 | Refills: 0 | Status: DISCONTINUED | OUTPATIENT
Start: 2018-12-03 | End: 2018-12-05

## 2018-12-03 RX ORDER — DOCUSATE SODIUM 100 MG
100 CAPSULE ORAL
Qty: 0 | Refills: 0 | Status: DISCONTINUED | OUTPATIENT
Start: 2018-12-03 | End: 2018-12-03

## 2018-12-03 RX ORDER — MORPHINE SULFATE 50 MG/1
4 CAPSULE, EXTENDED RELEASE ORAL
Qty: 0 | Refills: 0 | Status: DISCONTINUED | OUTPATIENT
Start: 2018-12-03 | End: 2018-12-03

## 2018-12-03 RX ORDER — SENNA PLUS 8.6 MG/1
2 TABLET ORAL AT BEDTIME
Qty: 0 | Refills: 0 | Status: DISCONTINUED | OUTPATIENT
Start: 2018-12-03 | End: 2018-12-06

## 2018-12-03 RX ORDER — ONDANSETRON 8 MG/1
4 TABLET, FILM COATED ORAL ONCE
Qty: 0 | Refills: 0 | Status: DISCONTINUED | OUTPATIENT
Start: 2018-12-03 | End: 2018-12-03

## 2018-12-03 RX ORDER — MORPHINE SULFATE 50 MG/1
4 CAPSULE, EXTENDED RELEASE ORAL
Qty: 0 | Refills: 0 | Status: DISCONTINUED | OUTPATIENT
Start: 2018-12-03 | End: 2018-12-04

## 2018-12-03 RX ORDER — SENNA PLUS 8.6 MG/1
2 TABLET ORAL AT BEDTIME
Qty: 0 | Refills: 0 | Status: DISCONTINUED | OUTPATIENT
Start: 2018-12-03 | End: 2018-12-03

## 2018-12-03 RX ORDER — MORPHINE SULFATE 50 MG/1
2 CAPSULE, EXTENDED RELEASE ORAL EVERY 4 HOURS
Qty: 0 | Refills: 0 | Status: DISCONTINUED | OUTPATIENT
Start: 2018-12-03 | End: 2018-12-04

## 2018-12-03 RX ADMIN — OXYCODONE AND ACETAMINOPHEN 1 TABLET(S): 5; 325 TABLET ORAL at 21:33

## 2018-12-03 RX ADMIN — MORPHINE SULFATE 4 MILLIGRAM(S): 50 CAPSULE, EXTENDED RELEASE ORAL at 15:52

## 2018-12-03 RX ADMIN — Medication 250 MILLIGRAM(S): at 06:36

## 2018-12-03 RX ADMIN — SENNA PLUS 2 TABLET(S): 8.6 TABLET ORAL at 21:20

## 2018-12-03 RX ADMIN — OXYCODONE AND ACETAMINOPHEN 1 TABLET(S): 5; 325 TABLET ORAL at 15:52

## 2018-12-03 RX ADMIN — MORPHINE SULFATE 4 MILLIGRAM(S): 50 CAPSULE, EXTENDED RELEASE ORAL at 15:23

## 2018-12-03 RX ADMIN — SODIUM CHLORIDE 100 MILLILITER(S): 9 INJECTION, SOLUTION INTRAVENOUS at 15:04

## 2018-12-03 RX ADMIN — Medication 300 MILLIGRAM(S): at 18:07

## 2018-12-03 RX ADMIN — MORPHINE SULFATE 2 MILLIGRAM(S): 50 CAPSULE, EXTENDED RELEASE ORAL at 18:42

## 2018-12-03 RX ADMIN — Medication 1 TABLET(S): at 06:35

## 2018-12-03 RX ADMIN — MORPHINE SULFATE 4 MILLIGRAM(S): 50 CAPSULE, EXTENDED RELEASE ORAL at 16:24

## 2018-12-03 RX ADMIN — MORPHINE SULFATE 2 MILLIGRAM(S): 50 CAPSULE, EXTENDED RELEASE ORAL at 22:49

## 2018-12-03 RX ADMIN — MORPHINE SULFATE 2 MILLIGRAM(S): 50 CAPSULE, EXTENDED RELEASE ORAL at 07:08

## 2018-12-03 NOTE — PROGRESS NOTE ADULT - SUBJECTIVE AND OBJECTIVE BOX
KOHLIWOODY REAVES  28y, Male    OVERNIGHT EVENTS:  afebrile    ROS negative except as per above    VITALS:  T(F): 98, Max: 98 (12-03-18 @ 09:13)  HR: 72  BP: 110/62  RR: 18Vital Signs Last 24 Hrs  T(C): 36.7 (03 Dec 2018 09:13), Max: 36.7 (03 Dec 2018 09:13)  T(F): 98 (03 Dec 2018 09:13), Max: 98 (03 Dec 2018 09:13)  HR: 72 (03 Dec 2018 06:57) (72 - 80)  BP: 110/62 (03 Dec 2018 09:13) (93/60 - 114/70)  BP(mean): --  RR: 18 (03 Dec 2018 06:57) (18 - 20)  SpO2: 98% (03 Dec 2018 09:13) (98% - 98%)    PHYSICAL EXAM  Gen: Awake and alert, non-toxic appearing, NAD  HEENT: NCAT. EOMI. MMM. no thrush  Neck: Supple, no cervical LAD  CV: RRR, no murmurs  Lungs: CTAB, no w/r/r  Abd: Soft. NTND  : L gluteal perineal abscess s/p I&D, induration, decreased  erythema  Extr: wwp, no edema  Skin: no rash  Neuro: No focal deficits  Lines: clean    TESTS & MEASUREMENTS:                        13.9   6.17  )-----------( 355      ( 02 Dec 2018 19:15 )             41.9     12-02    138  |  96<L>  |  25<H>  ----------------------------<  86  4.5   |  27  |  1.0    Ca    9.0      02 Dec 2018 19:15  Phos  4.6     12-02  Mg     2.0     12-02          Culture - Surgical Swab (collected 11-30-18 @ 13:11)  Source: .Surgical Swab None  Preliminary Report (12-02-18 @ 22:14):    Rare Staphylococcus aureus    Culture - Surgical Swab (collected 11-28-18 @ 12:05)  Source: Exudate None  Preliminary Report (11-30-18 @ 18:07):    Moderate Methicillin resistant Staphylococcus aureus  Organism: Methicillin resistant Staphylococcus aureus (11-30-18 @ 18:06)  Organism: Methicillin resistant Staphylococcus aureus (11-30-18 @ 18:06)      -  Ampicillin/Sulbactam: R 16/8      -  Cefazolin: R <=4      -  Clindamycin: R >4      -  Daptomycin: S 0.5      -  Erythromycin: R >4      -  Gentamicin: S <=1 Should not be used as monotherapy      -  Linezolid: S 2      -  Oxacillin: R >2      -  Penicillin: R >8      -  RIF- Rifampin: S <=1 Should not be used as monotherapy      -  Tetra/Doxy: S <=1      -  Trimethoprim/Sulfamethoxazole: S <=0.5/9.5      -  Vancomycin: S 2      Method Type: DARRIN    Culture - Surgical Swab (collected 11-28-18 @ 12:05)  Source: .Surgical Swab None  Preliminary Report (11-30-18 @ 18:05):    Few Methicillin resistant Staphylococcus aureus  Organism: Methicillin resistant Staphylococcus aureus (11-30-18 @ 18:04)  Organism: Methicillin resistant Staphylococcus aureus (11-30-18 @ 18:04)      -  Ampicillin/Sulbactam: R 16/8      -  Cefazolin: R <=4      -  Clindamycin: R >4      -  Daptomycin: S 0.5      -  Erythromycin: R >4      -  Gentamicin: S 2 Should not be used as monotherapy      -  Linezolid: S 2      -  Oxacillin: R >2      -  Penicillin: R >8      -  RIF- Rifampin: S <=1 Should not be used as monotherapy      -  Tetra/Doxy: S <=1      -  Trimethoprim/Sulfamethoxazole: S <=0.5/9.5      -  Vancomycin: S 2      Method Type: DARRIN    Culture - Blood (collected 11-28-18 @ 08:44)  Source: .Blood None  Preliminary Report (11-29-18 @ 17:01):    No growth to date.          RADIOLOGY & ADDITIONAL TESTS:    ANTIBIOTICS:    clindamycin IVPB   100 mL/Hr IV Intermittent (11-28-18 @ 05:08)   100 mL/Hr IV Intermittent (11-27-18 @ 22:00)   100 mL/Hr IV Intermittent (11-27-18 @ 16:22)    clindamycin IVPB   100 mL/Hr IV Intermittent (11-27-18 @ 06:12)    clindamycin IVPB   100 mL/Hr IV Intermittent (11-30-18 @ 05:40)   100 mL/Hr IV Intermittent (11-29-18 @ 21:06)   100 mL/Hr IV Intermittent (11-29-18 @ 13:00)   100 mL/Hr IV Intermittent (11-29-18 @ 05:22)   100 mL/Hr IV Intermittent (11-28-18 @ 21:13)   100 mL/Hr IV Intermittent (11-28-18 @ 14:46)    clindamycin IVPB   100 mL/Hr IV Intermittent (11-30-18 @ 15:34)    meropenem  IVPB   100 mL/Hr IV Intermittent (11-27-18 @ 00:47)    meropenem  IVPB   100 mL/Hr IV Intermittent (11-27-18 @ 06:55)    piperacillin/tazobactam IVPB.   100 mL/Hr IV Intermittent (11-28-18 @ 05:07)   100 mL/Hr IV Intermittent (11-28-18 @ 00:33)   100 mL/Hr IV Intermittent (11-27-18 @ 18:31)    piperacillin/tazobactam IVPB.   200 mL/Hr IV Intermittent (11-26-18 @ 01:11)    trimethoprim  160 mG/sulfamethoxazole 800 mG   1 Tablet(s) Oral (12-03-18 @ 06:35)   1 Tablet(s) Oral (12-02-18 @ 17:05)   1 Tablet(s) Oral (12-02-18 @ 09:17)   1 Tablet(s) Oral (12-01-18 @ 22:18)    vancomycin  IVPB   300 mL/Hr IV Intermittent (11-30-18 @ 22:39)    vancomycin  IVPB   250 mL/Hr IV Intermittent (12-03-18 @ 06:36)   250 mL/Hr IV Intermittent (12-02-18 @ 17:04)   250 mL/Hr IV Intermittent (12-02-18 @ 09:19)   250 mL/Hr IV Intermittent (12-01-18 @ 17:18)   250 mL/Hr IV Intermittent (12-01-18 @ 05:14)    vancomycin  IVPB   300 mL/Hr IV Intermittent (11-30-18 @ 06:18)   300 mL/Hr IV Intermittent (11-29-18 @ 17:55)   300 mL/Hr IV Intermittent (11-29-18 @ 05:22)   300 mL/Hr IV Intermittent (11-29-18 @ 02:56)    vancomycin  IVPB   250 mL/Hr IV Intermittent (11-25-18 @ 23:29)    vancomycin  IVPB   166.67 mL/Hr IV Intermittent (11-28-18 @ 14:00)   166.67 mL/Hr IV Intermittent (11-27-18 @ 21:58)   166.67 mL/Hr IV Intermittent (11-27-18 @ 06:54)   166.67 mL/Hr IV Intermittent (11-26-18 @ 18:22)   166.67 mL/Hr IV Intermittent (11-26-18 @ 11:59)        trimethoprim  160 mG/sulfamethoxazole 800 mG 1 Tablet(s) Oral every 12 hours  vancomycin  IVPB 2000 milliGRAM(s) IV Intermittent every 12 hours WOODY KOHLI  28y, Male    OVERNIGHT EVENTS:  afebrile    ROS negative except as per above    VITALS:  T(F): 98, Max: 98 (12-03-18 @ 09:13)  HR: 72  BP: 110/62  RR: 18Vital Signs Last 24 Hrs  T(C): 36.7 (03 Dec 2018 09:13), Max: 36.7 (03 Dec 2018 09:13)  T(F): 98 (03 Dec 2018 09:13), Max: 98 (03 Dec 2018 09:13)  HR: 72 (03 Dec 2018 06:57) (72 - 80)  BP: 110/62 (03 Dec 2018 09:13) (93/60 - 114/70)  BP(mean): --  RR: 18 (03 Dec 2018 06:57) (18 - 20)  SpO2: 98% (03 Dec 2018 09:13) (98% - 98%)    PHYSICAL EXAM  Gen: Awake and alert, non-toxic appearing, NAD  HEENT: NCAT. EOMI. MMM. no thrush  Neck: Supple, no cervical LAD  CV: RRR, no murmurs  Lungs: CTAB, no w/r/r  Abd: Soft. NTND  : L gluteal perineal wound, +drainage induration, decreased  erythema  Extr: wwp, no edema  Skin: no rash  Neuro: No focal deficits  Lines: clean    TESTS & MEASUREMENTS:                        13.9   6.17  )-----------( 355      ( 02 Dec 2018 19:15 )             41.9     12-02    138  |  96<L>  |  25<H>  ----------------------------<  86  4.5   |  27  |  1.0    Ca    9.0      02 Dec 2018 19:15  Phos  4.6     12-02  Mg     2.0     12-02          Culture - Surgical Swab (collected 11-30-18 @ 13:11)  Source: .Surgical Swab None  Preliminary Report (12-02-18 @ 22:14):    Rare Staphylococcus aureus    Culture - Surgical Swab (collected 11-28-18 @ 12:05)  Source: Exudate None  Preliminary Report (11-30-18 @ 18:07):    Moderate Methicillin resistant Staphylococcus aureus  Organism: Methicillin resistant Staphylococcus aureus (11-30-18 @ 18:06)  Organism: Methicillin resistant Staphylococcus aureus (11-30-18 @ 18:06)      -  Ampicillin/Sulbactam: R 16/8      -  Cefazolin: R <=4      -  Clindamycin: R >4      -  Daptomycin: S 0.5      -  Erythromycin: R >4      -  Gentamicin: S <=1 Should not be used as monotherapy      -  Linezolid: S 2      -  Oxacillin: R >2      -  Penicillin: R >8      -  RIF- Rifampin: S <=1 Should not be used as monotherapy      -  Tetra/Doxy: S <=1      -  Trimethoprim/Sulfamethoxazole: S <=0.5/9.5      -  Vancomycin: S 2      Method Type: DARRIN    Culture - Surgical Swab (collected 11-28-18 @ 12:05)  Source: .Surgical Swab None  Preliminary Report (11-30-18 @ 18:05):    Few Methicillin resistant Staphylococcus aureus  Organism: Methicillin resistant Staphylococcus aureus (11-30-18 @ 18:04)  Organism: Methicillin resistant Staphylococcus aureus (11-30-18 @ 18:04)      -  Ampicillin/Sulbactam: R 16/8      -  Cefazolin: R <=4      -  Clindamycin: R >4      -  Daptomycin: S 0.5      -  Erythromycin: R >4      -  Gentamicin: S 2 Should not be used as monotherapy      -  Linezolid: S 2      -  Oxacillin: R >2      -  Penicillin: R >8      -  RIF- Rifampin: S <=1 Should not be used as monotherapy      -  Tetra/Doxy: S <=1      -  Trimethoprim/Sulfamethoxazole: S <=0.5/9.5      -  Vancomycin: S 2      Method Type: DARRIN    Culture - Blood (collected 11-28-18 @ 08:44)  Source: .Blood None  Preliminary Report (11-29-18 @ 17:01):    No growth to date.          RADIOLOGY & ADDITIONAL TESTS:    ANTIBIOTICS:    clindamycin IVPB   100 mL/Hr IV Intermittent (11-28-18 @ 05:08)   100 mL/Hr IV Intermittent (11-27-18 @ 22:00)   100 mL/Hr IV Intermittent (11-27-18 @ 16:22)    clindamycin IVPB   100 mL/Hr IV Intermittent (11-27-18 @ 06:12)    clindamycin IVPB   100 mL/Hr IV Intermittent (11-30-18 @ 05:40)   100 mL/Hr IV Intermittent (11-29-18 @ 21:06)   100 mL/Hr IV Intermittent (11-29-18 @ 13:00)   100 mL/Hr IV Intermittent (11-29-18 @ 05:22)   100 mL/Hr IV Intermittent (11-28-18 @ 21:13)   100 mL/Hr IV Intermittent (11-28-18 @ 14:46)    clindamycin IVPB   100 mL/Hr IV Intermittent (11-30-18 @ 15:34)    meropenem  IVPB   100 mL/Hr IV Intermittent (11-27-18 @ 00:47)    meropenem  IVPB   100 mL/Hr IV Intermittent (11-27-18 @ 06:55)    piperacillin/tazobactam IVPB.   100 mL/Hr IV Intermittent (11-28-18 @ 05:07)   100 mL/Hr IV Intermittent (11-28-18 @ 00:33)   100 mL/Hr IV Intermittent (11-27-18 @ 18:31)    piperacillin/tazobactam IVPB.   200 mL/Hr IV Intermittent (11-26-18 @ 01:11)    trimethoprim  160 mG/sulfamethoxazole 800 mG   1 Tablet(s) Oral (12-03-18 @ 06:35)   1 Tablet(s) Oral (12-02-18 @ 17:05)   1 Tablet(s) Oral (12-02-18 @ 09:17)   1 Tablet(s) Oral (12-01-18 @ 22:18)    vancomycin  IVPB   300 mL/Hr IV Intermittent (11-30-18 @ 22:39)    vancomycin  IVPB   250 mL/Hr IV Intermittent (12-03-18 @ 06:36)   250 mL/Hr IV Intermittent (12-02-18 @ 17:04)   250 mL/Hr IV Intermittent (12-02-18 @ 09:19)   250 mL/Hr IV Intermittent (12-01-18 @ 17:18)   250 mL/Hr IV Intermittent (12-01-18 @ 05:14)    vancomycin  IVPB   300 mL/Hr IV Intermittent (11-30-18 @ 06:18)   300 mL/Hr IV Intermittent (11-29-18 @ 17:55)   300 mL/Hr IV Intermittent (11-29-18 @ 05:22)   300 mL/Hr IV Intermittent (11-29-18 @ 02:56)    vancomycin  IVPB   250 mL/Hr IV Intermittent (11-25-18 @ 23:29)    vancomycin  IVPB   166.67 mL/Hr IV Intermittent (11-28-18 @ 14:00)   166.67 mL/Hr IV Intermittent (11-27-18 @ 21:58)   166.67 mL/Hr IV Intermittent (11-27-18 @ 06:54)   166.67 mL/Hr IV Intermittent (11-26-18 @ 18:22)   166.67 mL/Hr IV Intermittent (11-26-18 @ 11:59)        trimethoprim  160 mG/sulfamethoxazole 800 mG 1 Tablet(s) Oral every 12 hours  vancomycin  IVPB 2000 milliGRAM(s) IV Intermittent every 12 hours

## 2018-12-03 NOTE — DIETITIAN INITIAL EVALUATION ADULT. - DIET TYPE
NPO for further debridement. Pt c/o hunger and becoming anxious d/t not eating. RD explained reasoning for NPO and pt verbalized understanding. Requesting Ensure when diet resumed--d/w LIP. will f/u in 3 days./NPO after midnight

## 2018-12-03 NOTE — PROGRESS NOTE ADULT - ASSESSMENT
29 yo M hx of HIV not on HAART due to insurance issue (last CD4 - 600 and viral load 150K 2 months ago as per patient) and recent MRSA to his finger 3 months ago which was treated in [place], hx of syphilis presents to ED for right buttock abscess/redness/swelling and pain worsening in the past 2 days.      Right Buttock/groin abscess  - s/p I & D at bedside with purulent drainage and then in OR with burn on 11/28/2018 and again in OR on 11/30/218  - c/w Vanco, d/c doxycycline for now as per ID  - not concerning for necrotizing fascitis, CT scan of abdomen and pelvis with I/V con urgent was done which did not show subcut emphysema or abscess  - Burn following; possible debridement with the closure of the wound today.  - Wound Cx shows MRSA, BCx NGTD  - c/w vanc to 1500 gm BID, will get trough levels today in pm and decide on next dose  - pain controlled   - ID following    HIV  - not on HAART for the past 2-3 years due to insurance issue.   - CD4 count 374/ HIV viral load 36356 copies/ml and 4.02 lg copy/ml  - ID following  - pt might qualify for housing and insurance   - will start him on therapy once he gets insurance.  - Filled all the forms and left them in the pts chart.    Hx of Syphilis  - patient reports that he was receiving penicillin IM but has missed the last dose.   - RPR is 1:4, Called [facility] in [place], [place] for records of RPR titers and therapy, faxed them the HIPPA release form, they will fax records to us.  - ID on board, will follow up with them      DVT ppx: Lovenox SC  GI ppx: not indicated.   Diet: Regular diet, NPO after midnight on Sunday  Activity: ambulates as tolerated.   Dispo: pt is homeless and needs to be setup for housing and insurance

## 2018-12-03 NOTE — BRIEF OPERATIVE NOTE - PRE-OP DX
Abscess  11/28/2018  abscess right buttock and right groin  Active  Nba Cintron
Abscess  11/28/2018  abscess right buttock and right groin  Active  Nba Cintron  Wound  11/30/2018  full thickness right groin and buttock s/p I& D of abscess  Active  Vicky Pereira
Abscess  11/28/2018  abscess right buttock and right groin  Active  Nba Cintron  Wound  11/30/2018  full thickness wound right groin and buttock s/p I& D of abscess  Active  Vicky Pereira

## 2018-12-03 NOTE — DIETITIAN INITIAL EVALUATION ADULT. - ORAL INTAKE PTA
Pt with hx of HIV not on HAART therapy. Pt reports poor PO intake PTA d/t reported homelessness for "extended period of time". Pt became tearful when RD asked about access to food PTA and asked for no further questioning in regards to diet. NKFA. Denies use of nutrition supplements./poor

## 2018-12-03 NOTE — DIETITIAN INITIAL EVALUATION ADULT. - OTHER INFO
Pt p/w with abscess to R buttocks s/p OR debridement and irrigation 11/28 and 11/30. Pt to undergo further debridement today. hx of Asymptomatic HIV and Syphilis. Reason for assessment: LOS.

## 2018-12-03 NOTE — PROGRESS NOTE ADULT - ASSESSMENT
28M    Asymptomatic HIV  Recent MRSA skin infection of the finger    Admitted with L groin/cellulitis/abscess  Continued fevers  s/p I&D    CT AP negative for fasciitis or collection  debridement 11/29, 11/30  wcx with s. aureus    #Abscess  - Vanc 2 q12h  - Repeat trough prior to 4th dose  - Burn consult appreciated  - When stable for D/C Can change to PO bactrim 2DS tabs PO BID to complete 10 days, then back to prophylactic bactrim 1 Ds tab per day dosing    #HIV CD4 375; 29%  - Previously on Genvoya, off for 2 years  - Dx 2016 while in senior care  - Needs insurance, housing  - If insurance confirmed, restart Genvoya  - f/u VL  - Send quantiferon gold  - STI screen urine and rectum for G/C    #Syphilis- reports 2/3 IM PCN  - TiTer 1:4  - Call [facility] in [place], [place] for records of RPR titers and therapy    Spectra [phone]    Asymptomatic HIV  Recent MRSA skin infection of the finger    Admitted with L groin/cellulitis/abscess  Continued fevers  s/p I&D    CT AP negative for fasciitis or collection  debridement 11/29, 11/30  wcx with s. aureus    #Abscess  - plan for OR today  - Vanc 1.5 q12h  - Repeat trough prior to 4th dose  - Burn consult appreciated  - When stable for D/C Can change to PO bactrim 2DS tabs PO BID to complete 10 days, then back to prophylactic bactrim 1 Ds tab per day dosing    #HIV CD4 375; 29%  - Previously on Genvoya, off for 2 years  - Dx 2016 while in longterm  - Needs insurance, housing  - If insurance confirmed, restart Genvoya  - f/u VL  - Send quantiferon gold  - STI screen urine and rectum for G/C    #Syphilis- reports 2/3 IM PCN  - TiTer 1:4  - Call [facility] in [place], [place] for records of RPR titers and therapy    Spectra [phone]

## 2018-12-03 NOTE — DIETITIAN INITIAL EVALUATION ADULT. - PHYSICAL APPEARANCE
BMI 21.5 (163#, 73in). Denies recent wt loss despite reported poor intake PTA. No muscle wasting or fat loss observed. no edema noted. R buttock abscess/BS 21.

## 2018-12-03 NOTE — BRIEF OPERATIVE NOTE - POST-OP DX
Abscess  11/28/2018  abscess right buttock and rigth groin  Active  Nba Cintron
Abscess  11/28/2018  abscess right buttock and rigth groin  Active  Nba Cintron  Wound  11/30/2018  full thickness wound of right groin and buttock s/p I & D of abscess  Active  Vicky Pereira
Abscess  11/28/2018  abscess right buttock and rigth groin  Active  Nba Cintron  Wound  11/30/2018  full thickness wound of right groin and buttock s/p I & D of abscess  Active  Vicky Pereira

## 2018-12-03 NOTE — PROGRESS NOTE ADULT - SUBJECTIVE AND OBJECTIVE BOX
WOODY KOHLI 28y Male  MRN#: [id number]   CODE STATUS: FULL      SUBJECTIVE  Patient is a 28y old Male who presents with a chief complaint of Right buttock abscess (27 Nov 2018 16:54)  Currently admitted to medicine with the primary diagnosis of Abscess of buttock, right  Hospital course has been complicated by undergoing I and D in the ED, pain and drainage in the groin, gluteal region.  Today is hospital day 7d, and this morning he is NPO and is scheduled for closure of the wound with burn.  He is afebrile now.      OBJECTIVE  PAST MEDICAL & SURGICAL HISTORY  Syphilis  HIV (human immunodeficiency virus infection)  No significant past surgical history    ALLERGIES:  Haldol (Other)    MEDICATIONS:  STANDING MEDICATIONS  docusate sodium 100 milliGRAM(s) Oral two times a day  enoxaparin Injectable 40 milliGRAM(s) SubCutaneous daily  influenza   Vaccine 0.5 milliLiter(s) IntraMuscular once  senna 2 Tablet(s) Oral at bedtime  trimethoprim  160 mG/sulfamethoxazole 800 mG 1 Tablet(s) Oral daily  vancomycin  IVPB 1500 milliGRAM(s) IV Intermittent every 12 hours    PRN MEDICATIONS  acetaminophen   Tablet .. 650 milliGRAM(s) Oral every 4 hours PRN  gentamicin 0.1% Ointment 1 Application(s) Topical two times a day PRN  morphine  - Injectable 2 milliGRAM(s) IV Push every 4 hours PRN  morphine  - Injectable 4 milliGRAM(s) IV Push two times a day PRN  oxyCODONE    5 mG/acetaminophen 325 mG 1 Tablet(s) Oral every 6 hours PRN      VITAL SIGNS: Last 24 Hours  T(C): 36.4 (03 Dec 2018 16:03), Max: 37 (03 Dec 2018 15:04)  T(F): 97.6 (03 Dec 2018 16:03), Max: 98.6 (03 Dec 2018 15:04)  HR: 66 (03 Dec 2018 16:03) (66 - 102)  BP: 104/77 (03 Dec 2018 16:03) (99/69 - 125/80)  BP(mean): --  RR: 17 (03 Dec 2018 16:03) (11 - 20)  SpO2: 98% (03 Dec 2018 16:03) (96% - 99%)    LABS:                        13.9   6.17  )-----------( 355      ( 02 Dec 2018 19:15 )             41.9     12-02    138  |  96<L>  |  25<H>  ----------------------------<  86  4.5   |  27  |  1.0    Ca    9.0      02 Dec 2018 19:15  Phos  4.6     12-02  Mg     2.0     12-02                    RADIOLOGY:    < from: CT Abdomen and Pelvis w/ IV Cont (11.27.18 @ 21:28) >    IMPRESSION:   1. Diffuse subcutaneous inflammatory stranding and swelling with in right   upper medial thigh and right groin, consistent with cellulitis, without   CT evidence of abscess or subcutaneous emphysema to suggest necrotizing   fasciitis at this time. Close clinical monitoring recommended.    < end of copied text >      PHYSICAL EXAM:    GENERAL: NAD, well-developed, AAOx3  HEENT:  Atraumatic, Normocephalic. EOMI, PERRLA, conjunctiva and sclera clear, No JVD  PULMONARY: Clear to auscultation bilaterally; No wheeze  CARDIOVASCULAR: Regular rate and rhythm; No murmurs, rubs, or gallops  GASTROINTESTINAL: Soft, Nontender, Nondistended; Bowel sounds present  MUSCULOSKELETAL:  2+ Peripheral Pulses, No clubbing, cyanosis, or edema  NEUROLOGY: non-focal  SKIN: wound in the right gluteal and thigh

## 2018-12-03 NOTE — BRIEF OPERATIVE NOTE - PROCEDURE
Debridement and closure of wound  12/03/2018  sharp excisional debridement of subcutaneous tissue and fascia   layered closure of right groin  and buttock 12 x 6 cm  Active  [id number] <<-----Click on this checkbox to enter Procedure

## 2018-12-03 NOTE — CHART NOTE - NSCHARTNOTEFT_GEN_A_CORE
28y Male    for debridment #3 of groin and buttock    Haldol (Other)      HPI:  27 yo M hx of HIV not on HAART due to insurance issue (last CD4 - 600 and viral load 150K 2 months ago as per patient) and recent MRSA to his finger 3 months ago which was treated in [place] and hx of Syphilis presents to ED for right buttock abscess/redness/swelling and pain worsening in the past 2 days. Also reports subjective fever and chills with generalized weakness. Patient reports that he was recently admitted to hospital in [place] and treated with Vanco and discharged home on 30 days course of Doxycycline. However, patient only finished 20 days of doxycycline due to upset stomach.  Denied headache, rhinorrhea, sore throat, oral thrush, dysphagia, odynophagia, abdominal pain, change in bowel movement, change in urination.       Patient also reported he hasn't been taken any medications for his HIV in the past 2-3 years because he doesn't have insurance in [place]. Also reports that he was dx with Syphilis but has been receiving treatment with Penicillin IM in the past, however missed last dose. (26 Nov 2018 00:34)      PAST MEDICAL & SURGICAL HISTORY:  Syphilis  HIV (human immunodeficiency virus infection)  No significant past surgical history        MEDICATIONS  (STANDING):  docusate sodium 100 milliGRAM(s) Oral two times a day  enoxaparin Injectable 40 milliGRAM(s) SubCutaneous daily  influenza   Vaccine 0.5 milliLiter(s) IntraMuscular once  senna 2 Tablet(s) Oral at bedtime  trimethoprim  160 mG/sulfamethoxazole 800 mG 1 Tablet(s) Oral daily  vancomycin  IVPB 1500 milliGRAM(s) IV Intermittent every 12 hours    MEDICATIONS  (PRN):  acetaminophen   Tablet .. 650 milliGRAM(s) Oral every 4 hours PRN Temp greater or equal to 38C (100.4F)  gentamicin 0.1% Ointment 1 Application(s) Topical two times a day PRN wound care  morphine  - Injectable 4 milliGRAM(s) IV Push two times a day PRN wound care  morphine  - Injectable 2 milliGRAM(s) IV Push every 4 hours PRN Severe Pain (7 - 10)  oxyCODONE    5 mG/acetaminophen 325 mG 1 Tablet(s) Oral every 6 hours PRN Severe Pain (7 - 10)    Home Medications:      Allergies    Haldol (Other)    Intolerances        SOCIAL HISTORY: smokes cig, 10/day, marijuana (last 2 weeks ago    FAMILY HISTORY:  No pertinent family history in first degree relatives      Vital Signs Last 24 Hrs  T(C): 36.7 (03 Dec 2018 12:40), Max: 36.7 (03 Dec 2018 09:13)  T(F): 98 (03 Dec 2018 12:40), Max: 98 (03 Dec 2018 09:13)  HR: 90 (03 Dec 2018 12:40) (72 - 90)  BP: 125/80 (03 Dec 2018 12:40) (108/62 - 125/80)  BP(mean): --  RR: 18 (03 Dec 2018 12:40) (18 - 20)  SpO2: 98% (03 Dec 2018 09:13) (98% - 98%)    NPO: _x___ Yes  ____ No    Exam:  Drug Dosing Weight  Height (cm): 185.42 (03 Dec 2018 09:13)  Weight (kg): 74 (03 Dec 2018 09:13)  BMI (kg/m2): 21.5 (03 Dec 2018 09:13)  BSA (m2): 1.97 (03 Dec 2018 09:13)    MP: I  Teeth: Intact  Mouth opening:    LABS:                        13.9   6.17  )-----------( 355      ( 02 Dec 2018 19:15 )             41.9                         12.8   7.06  )-----------( 336      ( 01 Dec 2018 08:37 )             37.8                         12.4   5.71  )-----------( 333      ( 30 Nov 2018 08:07 )             38.0         12-02    138  |  96<L>  |  25<H>  ----------------------------<  86  4.5   |  27  |  1.0  12-01    139  |  98  |  16  ----------------------------<  110<H>  4.3   |  25  |  0.7  11-30    142  |  100  |  13  ----------------------------<  77  4.6   |  27  |  0.8  11-29    141  |  100  |  13  ----------------------------<  139<H>  4.3   |  25  |  0.7  11-28    139  |  96<L>  |  14  ----------------------------<  109<H>  4.8   |  29  |  1.0    Ca    9.0      02 Dec 2018 19:15  Ca    9.1      01 Dec 2018 08:37  Ca    9.1      30 Nov 2018 08:07  Phos  4.6     12-02  Mg     2.0     12-02  Mg     2.3     11-28  Mg     2.0     11-28    TPro  7.4  /  Alb  3.6  /  TBili  <0.2  /  DBili  x   /  AST  15  /  ALT  16  /  AlkPhos  49  12-01  TPro  7.1  /  Alb  3.6  /  TBili  <0.2  /  DBili  x   /  AST  16  /  ALT  14  /  AlkPhos  53  11-30  TPro  6.8  /  Alb  3.1<L>  /  TBili  <0.2  /  DBili  x   /  AST  14  /  ALT  11  /  AlkPhos  55  11-29          RADIOLOGY & ADDITIONAL STUDIES:      ASA _III___,  R/B/A discussed with: _x___ patient, ____ guardian, Understand and Accepts,  Question Answered.

## 2018-12-03 NOTE — CHART NOTE - NSCHARTNOTEFT_GEN_A_CORE
PACU ANESTHESIA ADMISSION NOTE      Procedure: Abscess drainage: right groin 4 x1 cm  Debridement and irrigation of wound of trunk: right groin and buttock  Abscess drainage: abscess drainage right buttock and right groin and sharp excisional debridement  [id number]    Post op diagnosis:  Wound Abscess      ____  Intubated  TV:______       Rate: ______      FiO2: ______    _x___  Patent Airway    _x___  Full return of protective reflexes    _x___  Full recovery from anesthesia / back to baseline status    Vitals:  T(C): 36.7 (12-03-18 @ 12:40), Max: 36.7 (12-03-18 @ 09:13)  HR: 90 (12-03-18 @ 12:40) (72 - 90)  BP: 125/80 (12-03-18 @ 12:40) (108/62 - 125/80)  RR: 18 (12-03-18 @ 12:40) (18 - 20)  SpO2: 98% (12-03-18 @ 09:13) (98% - 98%)    Mental Status:  _x___ Awake   _____ Alert   _____ Drowsy   _____ Sedated    Nausea/Vomiting:  _x___  NO       ______Yes,   See Post - Op Orders         Pain Scale (0-10):  _____    Treatment: ____ None    __x__ See Post - Op/PCA Orders    Post - Operative Fluids:   ____ Oral   ___x_ See Post - Op Orders    Plan: Discharge:   ____Home       _x____Floor     _____Critical Care    _____  Other:_________________    Comments:  No anesthesia issues or complications noted.  Discharge when criteria met.

## 2018-12-03 NOTE — DIETITIAN INITIAL EVALUATION ADULT. - ENERGY NEEDS
total kcal = ~1761-1160 kcal/day (MSJ x 1.2-1.3).   total protein = 81-96 g/day (1.1-1.3 g/kg CBW in pt with HIV and abscess noted).  total fluid = 1mL : 1kcal

## 2018-12-03 NOTE — DIETITIAN INITIAL EVALUATION ADULT. - MD RECOMMEND
Reccs: 1. As medically able resume Regular diet and add Ensure Enlive BID. 2. consider adding daily multivitamin.

## 2018-12-04 LAB
ALBUMIN SERPL ELPH-MCNC: 3.8 G/DL — SIGNIFICANT CHANGE UP (ref 3.5–5.2)
ALP SERPL-CCNC: 56 U/L — SIGNIFICANT CHANGE UP (ref 30–115)
ALT FLD-CCNC: 19 U/L — SIGNIFICANT CHANGE UP (ref 0–41)
AST SERPL-CCNC: 16 U/L — SIGNIFICANT CHANGE UP (ref 0–41)
BASOPHILS # BLD AUTO: 0.03 K/UL — SIGNIFICANT CHANGE UP (ref 0–0.2)
BASOPHILS NFR BLD AUTO: 0.4 % — SIGNIFICANT CHANGE UP (ref 0–1)
BILIRUB SERPL-MCNC: <0.2 MG/DL — SIGNIFICANT CHANGE UP (ref 0.2–1.2)
BUN SERPL-MCNC: 21 MG/DL — HIGH (ref 10–20)
CALCIUM SERPL-MCNC: 9 MG/DL — SIGNIFICANT CHANGE UP (ref 8.5–10.1)
CHLORIDE SERPL-SCNC: 97 MMOL/L — LOW (ref 98–110)
CO2 SERPL-SCNC: 24 MMOL/L — SIGNIFICANT CHANGE UP (ref 17–32)
CREAT SERPL-MCNC: 0.9 MG/DL — SIGNIFICANT CHANGE UP (ref 0.7–1.5)
EOSINOPHIL # BLD AUTO: 0.1 K/UL — SIGNIFICANT CHANGE UP (ref 0–0.7)
EOSINOPHIL NFR BLD AUTO: 1.5 % — SIGNIFICANT CHANGE UP (ref 0–8)
GLUCOSE SERPL-MCNC: 78 MG/DL — SIGNIFICANT CHANGE UP (ref 70–99)
HCT VFR BLD CALC: 40 % — LOW (ref 42–52)
HGB BLD-MCNC: 13.4 G/DL — LOW (ref 14–18)
IMM GRANULOCYTES NFR BLD AUTO: 2.8 % — HIGH (ref 0.1–0.3)
LYMPHOCYTES # BLD AUTO: 1.57 K/UL — SIGNIFICANT CHANGE UP (ref 1.2–3.4)
LYMPHOCYTES # BLD AUTO: 22.9 % — SIGNIFICANT CHANGE UP (ref 20.5–51.1)
MAGNESIUM SERPL-MCNC: 2.1 MG/DL — SIGNIFICANT CHANGE UP (ref 1.8–2.4)
MCHC RBC-ENTMCNC: 28.3 PG — SIGNIFICANT CHANGE UP (ref 27–31)
MCHC RBC-ENTMCNC: 33.5 G/DL — SIGNIFICANT CHANGE UP (ref 32–37)
MCV RBC AUTO: 84.6 FL — SIGNIFICANT CHANGE UP (ref 80–94)
MONOCYTES # BLD AUTO: 0.67 K/UL — HIGH (ref 0.1–0.6)
MONOCYTES NFR BLD AUTO: 9.8 % — HIGH (ref 1.7–9.3)
NEUTROPHILS # BLD AUTO: 4.3 K/UL — SIGNIFICANT CHANGE UP (ref 1.4–6.5)
NEUTROPHILS NFR BLD AUTO: 62.6 % — SIGNIFICANT CHANGE UP (ref 42.2–75.2)
NRBC # BLD: 0 /100 WBCS — SIGNIFICANT CHANGE UP (ref 0–0)
PLATELET # BLD AUTO: 387 K/UL — SIGNIFICANT CHANGE UP (ref 130–400)
POTASSIUM SERPL-MCNC: 4.8 MMOL/L — SIGNIFICANT CHANGE UP (ref 3.5–5)
POTASSIUM SERPL-SCNC: 4.8 MMOL/L — SIGNIFICANT CHANGE UP (ref 3.5–5)
PROT SERPL-MCNC: 7.3 G/DL — SIGNIFICANT CHANGE UP (ref 6–8)
RBC # BLD: 4.73 M/UL — SIGNIFICANT CHANGE UP (ref 4.7–6.1)
RBC # FLD: 14.2 % — SIGNIFICANT CHANGE UP (ref 11.5–14.5)
SODIUM SERPL-SCNC: 137 MMOL/L — SIGNIFICANT CHANGE UP (ref 135–146)
WBC # BLD: 6.86 K/UL — SIGNIFICANT CHANGE UP (ref 4.8–10.8)
WBC # FLD AUTO: 6.86 K/UL — SIGNIFICANT CHANGE UP (ref 4.8–10.8)

## 2018-12-04 RX ORDER — CHLORHEXIDINE GLUCONATE 213 G/1000ML
1 SOLUTION TOPICAL EVERY 12 HOURS
Qty: 0 | Refills: 0 | Status: COMPLETED | OUTPATIENT
Start: 2018-12-04 | End: 2018-12-04

## 2018-12-04 RX ORDER — ELVITEGRAVIR, COBICISTAT, EMTRICITABINE, AND TENOFOVIR ALAFENAMIDE 150; 150; 200; 10 MG/1; MG/1; MG/1; MG/1
1 TABLET ORAL
Qty: 30 | Refills: 0 | OUTPATIENT
Start: 2018-12-04 | End: 2019-01-02

## 2018-12-04 RX ORDER — MORPHINE SULFATE 50 MG/1
4 CAPSULE, EXTENDED RELEASE ORAL ONCE
Qty: 0 | Refills: 0 | Status: DISCONTINUED | OUTPATIENT
Start: 2018-12-04 | End: 2018-12-05

## 2018-12-04 RX ADMIN — MORPHINE SULFATE 2 MILLIGRAM(S): 50 CAPSULE, EXTENDED RELEASE ORAL at 06:11

## 2018-12-04 RX ADMIN — Medication 1 TABLET(S): at 11:43

## 2018-12-04 RX ADMIN — Medication 300 MILLIGRAM(S): at 05:30

## 2018-12-04 RX ADMIN — OXYCODONE AND ACETAMINOPHEN 1 TABLET(S): 5; 325 TABLET ORAL at 11:44

## 2018-12-04 RX ADMIN — Medication 300 MILLIGRAM(S): at 17:52

## 2018-12-04 RX ADMIN — OXYCODONE AND ACETAMINOPHEN 1 TABLET(S): 5; 325 TABLET ORAL at 05:28

## 2018-12-04 RX ADMIN — OXYCODONE AND ACETAMINOPHEN 1 TABLET(S): 5; 325 TABLET ORAL at 22:07

## 2018-12-04 RX ADMIN — OXYCODONE AND ACETAMINOPHEN 1 TABLET(S): 5; 325 TABLET ORAL at 12:00

## 2018-12-04 NOTE — PROGRESS NOTE ADULT - ASSESSMENT
27 yo M hx of HIV not on HAART due to insurance issue (last CD4 - 600 and viral load 150K 2 months ago as per patient) and recent MRSA to his finger 3 months ago which was treated in [place], hx of syphilis presents to ED for right buttock abscess/redness/swelling and pain worsening in the past 2 days.    Right Buttock/groin abscess  - s/p I & D at bedside with purulent drainage and then in OR with burn on 11/28/2018 and again in OR on 11/30/218  - c/w Vanco until patient is d/c as per ID  - switch to bactrim on discharge  - not concerning for necrotizing fascitis, CT scan of abdomen and pelvis with I/V con urgent was done which did not show subcut emphysema or abscess  - Burn following; s/p debridement with the closure of the wound, burn will assess prior to determine d/c plan  - Wound Cx shows MRSA, BCx NGTD  - c/w vanc to 1500 gm BID  - Vancomycin Level, Trough: 21.1 (12.03.18 @ 17:24)  - pain controlled   - ID following    HIV  - not on HAART for the past 2-3 years due to insurance issue.   - CD4 count 374/ HIV viral load 34595 copies/ml and 4.02 lg copy/ml  - ID following  - pt qualifed for housing and insurance   - will start him on therapy once he gets insurance.  - insurance accepted but likely won't arrive till tomorrow     Hx of Syphilis  - patient reports that he was receiving penicillin IM but has missed the last dose.   - RPR is 1:4, Called [facility] [place] [facility] in [place], [place] for records of RPR titers and therapy, faxed them the HIPPA release form, they will fax records to us.  - ID on board, will follow up with them, ID will let us know whether pt needs a 3rd dose of pencillin    DVT ppx: Lovenox SC  GI ppx: not indicated.   Diet: Regular diet  Activity: ambulates as tolerated.   Dispo: pt is homeless and accepted for housing and insurance, need wound care instruction from burn prior to d/c

## 2018-12-04 NOTE — ANESTHESIA FOLLOW-UP NOTE - NSEVALATION_GEN_ALL_CORE
No apparent complications or complaints regarding anesthesia care at this time/All questions were answered
No apparent complications or complaints regarding anesthesia care at this time

## 2018-12-04 NOTE — PROGRESS NOTE ADULT - SUBJECTIVE AND OBJECTIVE BOX
WOODY KOHLI 28y Male  MRN#: [id number]       SUBJECTIVE  Patient is a 28y old Male who presents with a chief complaint of Right buttock abscess (04 Dec 2018 10:26)    he is currently admitted to medicine with the primary diagnosis of Abscess of buttock, right    Today is hospital day 8d, and this morning he is lying in bed without distress.     No acute overnight events.     OBJECTIVE  PAST MEDICAL & SURGICAL HISTORY  Syphilis  HIV (human immunodeficiency virus infection)  No significant past surgical history    ALLERGIES:  Haldol (Other)    MEDICATIONS:  STANDING MEDICATIONS  chlorhexidine 4% Liquid 1 Application(s) Topical every 12 hours  docusate sodium 100 milliGRAM(s) Oral two times a day  enoxaparin Injectable 40 milliGRAM(s) SubCutaneous daily  influenza   Vaccine 0.5 milliLiter(s) IntraMuscular once  senna 2 Tablet(s) Oral at bedtime  trimethoprim  160 mG/sulfamethoxazole 800 mG 1 Tablet(s) Oral daily  vancomycin  IVPB 1500 milliGRAM(s) IV Intermittent every 12 hours    PRN MEDICATIONS  acetaminophen   Tablet .. 650 milliGRAM(s) Oral every 4 hours PRN  gentamicin 0.1% Ointment 1 Application(s) Topical two times a day PRN  morphine  - Injectable 4 milliGRAM(s) IV Push once PRN  oxyCODONE    5 mG/acetaminophen 325 mG 1 Tablet(s) Oral every 6 hours PRN    HOME MEDICATIONS  Home Medications:      VITAL SIGNS: Last 24 Hours  T(C): 36.2 (04 Dec 2018 05:30), Max: 37 (03 Dec 2018 15:04)  T(F): 97.2 (04 Dec 2018 05:30), Max: 98.6 (03 Dec 2018 15:04)  HR: 73 (04 Dec 2018 05:30) (66 - 102)  BP: 111/54 (04 Dec 2018 05:30) (99/69 - 125/80)  BP(mean): --  RR: 18 (04 Dec 2018 05:30) (11 - 18)  SpO2: 98% (03 Dec 2018 16:03) (96% - 99%)    LABS:                        13.4   6.86  )-----------( 387      ( 04 Dec 2018 07:23 )             40.0     12-04    137  |  97<L>  |  21<H>  ----------------------------<  78  4.8   |  24  |  0.9    Ca    9.0      04 Dec 2018 07:23  Phos  4.6     12-02  Mg     2.1     12-04    TPro  7.3  /  Alb  3.8  /  TBili  <0.2  /  DBili  x   /  AST  16  /  ALT  19  /  AlkPhos  56  12-04    LIVER FUNCTIONS - ( 04 Dec 2018 07:23 )  Alb: 3.8 g/dL / Pro: 7.3 g/dL / ALK PHOS: 56 U/L / ALT: 19 U/L / AST: 16 U/L / GGT: x             CAPILLARY BLOOD GLUCOSE      RADIOLOGY:    < from: CT Abdomen and Pelvis w/ IV Cont (11.27.18 @ 21:28) >  IMPRESSION:   1. Diffuse subcutaneous inflammatory stranding and swelling with in right   upper medial thigh and right groin, consistent with cellulitis, without   CT evidence of abscess or subcutaneous emphysema to suggest necrotizing   fasciitis at this time. Close clinical monitoring recommended.  < end of copied text >      PHYSICAL EXAM:    GENERAL: NAD, well-developed, AAOx3, GALLO tube noted with sensigenous fluid drainage.  HEENT:  Atraumatic, Normocephalic. EOMI, conjunctiva and sclera white, No JVD  PULMONARY: Clear to auscultation bilaterally; No wheeze  CARDIOVASCULAR: Regular rate and rhythm; No murmurs.   GASTROINTESTINAL: Soft, Nontender, Nondistended; Bowel sounds present  EXTREMITIES:  2+ Peripheral Pulses, No clubbing, cyanosis, or edema  NEUROLOGY: non-focal  SKIN: Dressing C/D/I with GALLO drainage on right groin      ADMISSION SUMMARY  Patient is a 28y old Male who presents with a chief complaint of Right buttock abscess (04 Dec 2018 10:26)     he currently admitted to medicine with the primary diagnosis of Abscess of buttock, right

## 2018-12-04 NOTE — ANESTHESIA FOLLOW-UP NOTE - NSINTERVIEW_GEN_ALL_CORE
Interviewed and evaluated

## 2018-12-04 NOTE — PROGRESS NOTE ADULT - ASSESSMENT
right groin and buttock abscess healing post debridement and closure    rec: soap and water qd, dry gauze dressing change bid, will remove GALLO drain 12-36 hours and d/c home 12-36 hours    no further surgery needed

## 2018-12-04 NOTE — PROGRESS NOTE ADULT - SUBJECTIVE AND OBJECTIVE BOX
KOHLILEVAR REAVESN  28y, Male      OVERNIGHT EVENTS:  s/p OR closure    ROS negative except as per above    VITALS:  T(F): 97.2, Max: 98.6 (12-03-18 @ 15:04)  HR: 73  BP: 111/54  RR: 18Vital Signs Last 24 Hrs  T(C): 36.2 (04 Dec 2018 05:30), Max: 37 (03 Dec 2018 15:04)  T(F): 97.2 (04 Dec 2018 05:30), Max: 98.6 (03 Dec 2018 15:04)  HR: 73 (04 Dec 2018 05:30) (66 - 102)  BP: 111/54 (04 Dec 2018 05:30) (99/69 - 125/80)  BP(mean): --  RR: 18 (04 Dec 2018 05:30) (11 - 18)  SpO2: 98% (03 Dec 2018 16:03) (96% - 99%)    PHYSICAL EXAM  Gen: Awake and alert, non-toxic appearing, NAD  HEENT: NCAT. EOMI. MMM. no thrush  Neck: Supple, no cervical LAD  CV: RRR, no murmurs  Lungs: CTAB, no w/r/r  Abd: Soft. NTND  : L gluteal perineal wound,s/p closure  Extr: wwp, no edema  Skin: no rash  Neuro: No focal deficits  Lines: clean    TESTS & MEASUREMENTS:                        13.4   6.86  )-----------( 387      ( 04 Dec 2018 07:23 )             40.0     12-04    137  |  97<L>  |  21<H>  ----------------------------<  78  4.8   |  24  |  0.9    Ca    9.0      04 Dec 2018 07:23  Phos  4.6     12-02  Mg     2.1     12-04    TPro  7.3  /  Alb  3.8  /  TBili  <0.2  /  DBili  x   /  AST  16  /  ALT  19  /  AlkPhos  56  12-04    LIVER FUNCTIONS - ( 04 Dec 2018 07:23 )  Alb: 3.8 g/dL / Pro: 7.3 g/dL / ALK PHOS: 56 U/L / ALT: 19 U/L / AST: 16 U/L / GGT: x             Culture - Surgical Swab (collected 11-30-18 @ 13:11)  Source: .Surgical Swab None  Preliminary Report (12-03-18 @ 19:24):    Rare Methicillin resistant Staphylococcus aureus  Organism: Methicillin resistant Staphylococcus aureus (12-03-18 @ 19:13)  Organism: Methicillin resistant Staphylococcus aureus (12-03-18 @ 19:13)      -  Ampicillin/Sulbactam: R 16/8      -  Cefazolin: R 8      -  Clindamycin: R >4      -  Daptomycin: S 1      -  Erythromycin: R >4      -  Gentamicin: S 2 Should not be used as monotherapy      -  Linezolid: S 2      -  Oxacillin: R >2      -  Penicillin: R >8      -  RIF- Rifampin: S <=1 Should not be used as monotherapy      -  Tetra/Doxy: S <=1      -  Trimethoprim/Sulfamethoxazole: S <=0.5/9.5      -  Vancomycin: S 2      Method Type: DARRIN    Culture - Surgical Swab (collected 11-28-18 @ 12:05)  Source: Exudate None  Final Report (12-03-18 @ 17:46):    Moderate Methicillin resistant Staphylococcus aureus  Organism: Methicillin resistant Staphylococcus aureus (12-03-18 @ 17:46)  Organism: Methicillin resistant Staphylococcus aureus (12-03-18 @ 17:46)      -  Ampicillin/Sulbactam: R 16/8      -  Cefazolin: R <=4      -  Clindamycin: R >4      -  Daptomycin: S 0.5      -  Erythromycin: R >4      -  Gentamicin: S <=1 Should not be used as monotherapy      -  Linezolid: S 2      -  Oxacillin: R >2      -  Penicillin: R >8      -  RIF- Rifampin: S <=1 Should not be used as monotherapy      -  Tetra/Doxy: S <=1      -  Trimethoprim/Sulfamethoxazole: S <=0.5/9.5      -  Vancomycin: S 2      Method Type: DARRIN    Culture - Surgical Swab (collected 11-28-18 @ 12:05)  Source: .Surgical Swab None  Final Report (12-03-18 @ 17:47):    Few Methicillin resistant Staphylococcus aureus  Organism: Methicillin resistant Staphylococcus aureus (12-03-18 @ 17:47)  Organism: Methicillin resistant Staphylococcus aureus (12-03-18 @ 17:47)      -  Ampicillin/Sulbactam: R 16/8      -  Cefazolin: R <=4      -  Clindamycin: R >4      -  Daptomycin: S 0.5      -  Erythromycin: R >4      -  Gentamicin: S 2 Should not be used as monotherapy      -  Linezolid: S 2      -  Oxacillin: R >2      -  Penicillin: R >8      -  RIF- Rifampin: S <=1 Should not be used as monotherapy      -  Tetra/Doxy: S <=1      -  Trimethoprim/Sulfamethoxazole: S <=0.5/9.5      -  Vancomycin: S 2      Method Type: DARRIN    Culture - Blood (collected 11-28-18 @ 08:44)  Source: .Blood None  Final Report (12-03-18 @ 17:01):    No growth at 5 days.          RADIOLOGY & ADDITIONAL TESTS:    ANTIBIOTICS:    clindamycin IVPB   100 mL/Hr IV Intermittent (11-28-18 @ 05:08)   100 mL/Hr IV Intermittent (11-27-18 @ 22:00)   100 mL/Hr IV Intermittent (11-27-18 @ 16:22)    clindamycin IVPB   100 mL/Hr IV Intermittent (11-27-18 @ 06:12)    clindamycin IVPB   100 mL/Hr IV Intermittent (11-30-18 @ 05:40)   100 mL/Hr IV Intermittent (11-29-18 @ 21:06)   100 mL/Hr IV Intermittent (11-29-18 @ 13:00)   100 mL/Hr IV Intermittent (11-29-18 @ 05:22)   100 mL/Hr IV Intermittent (11-28-18 @ 21:13)   100 mL/Hr IV Intermittent (11-28-18 @ 14:46)    clindamycin IVPB   100 mL/Hr IV Intermittent (11-30-18 @ 15:34)    meropenem  IVPB   100 mL/Hr IV Intermittent (11-27-18 @ 00:47)    meropenem  IVPB   100 mL/Hr IV Intermittent (11-27-18 @ 06:55)    piperacillin/tazobactam IVPB.   100 mL/Hr IV Intermittent (11-28-18 @ 05:07)   100 mL/Hr IV Intermittent (11-28-18 @ 00:33)   100 mL/Hr IV Intermittent (11-27-18 @ 18:31)    piperacillin/tazobactam IVPB.   200 mL/Hr IV Intermittent (11-26-18 @ 01:11)    trimethoprim  160 mG/sulfamethoxazole 800 mG   1 Tablet(s) Oral (12-03-18 @ 06:35)   1 Tablet(s) Oral (12-02-18 @ 17:05)   1 Tablet(s) Oral (12-02-18 @ 09:17)   1 Tablet(s) Oral (12-01-18 @ 22:18)    vancomycin  IVPB   300 mL/Hr IV Intermittent (12-04-18 @ 05:30)   300 mL/Hr IV Intermittent (12-03-18 @ 18:07)    vancomycin  IVPB   300 mL/Hr IV Intermittent (11-30-18 @ 22:39)    vancomycin  IVPB   250 mL/Hr IV Intermittent (12-03-18 @ 06:36)   250 mL/Hr IV Intermittent (12-02-18 @ 17:04)   250 mL/Hr IV Intermittent (12-02-18 @ 09:19)   250 mL/Hr IV Intermittent (12-01-18 @ 17:18)   250 mL/Hr IV Intermittent (12-01-18 @ 05:14)    vancomycin  IVPB   300 mL/Hr IV Intermittent (11-30-18 @ 06:18)   300 mL/Hr IV Intermittent (11-29-18 @ 17:55)   300 mL/Hr IV Intermittent (11-29-18 @ 05:22)   300 mL/Hr IV Intermittent (11-29-18 @ 02:56)    vancomycin  IVPB   250 mL/Hr IV Intermittent (11-25-18 @ 23:29)    vancomycin  IVPB   166.67 mL/Hr IV Intermittent (11-28-18 @ 14:00)   166.67 mL/Hr IV Intermittent (11-27-18 @ 21:58)   166.67 mL/Hr IV Intermittent (11-27-18 @ 06:54)   166.67 mL/Hr IV Intermittent (11-26-18 @ 18:22)   166.67 mL/Hr IV Intermittent (11-26-18 @ 11:59)        trimethoprim  160 mG/sulfamethoxazole 800 mG 1 Tablet(s) Oral daily  vancomycin  IVPB 1500 milliGRAM(s) IV Intermittent every 12 hours

## 2018-12-04 NOTE — CHART NOTE - NSCHARTNOTEFT_GEN_A_CORE
Bactrim 2DS tabs PO BID - 10 days supply and Genvoya PO qD - 30 days supply sent to [facility] for price check.

## 2018-12-04 NOTE — PROGRESS NOTE ADULT - ASSESSMENT
28M    Asymptomatic HIV  Recent MRSA skin infection of the finger    Admitted with L groin/cellulitis/abscess  Continued fevers  s/p I&D    CT AP negative for fasciitis or collection  debridement 11/29, 11/30  wcx with s. aureus  12/3 s/p OR closure    #Abscess  - Vanc 1.5 q12h while-in house  - Burn consult appreciated  - When stable for D/C Can change to PO bactrim 2DS tabs PO BID to complete 10 days, then back to prophylactic bactrim 1 Ds tab per day dosing    #HIV CD4 375; 29% VL 10,499  - Previously on Genvoya, off for 2 years  - Dx 2016 while in [place]  - Needs insurance, housing  - If insurance confirmed, restart Genvoya  - STI screen urine and rectum for G/C    #Syphilis- reports 2/3 IM PCN  - TiTer 1:4  - Call [facility] in [place], [place] for records of RPR titers and therapy    Spectra [id number]

## 2018-12-05 LAB
ALBUMIN SERPL ELPH-MCNC: 3.8 G/DL — SIGNIFICANT CHANGE UP (ref 3.5–5.2)
ALP SERPL-CCNC: 57 U/L — SIGNIFICANT CHANGE UP (ref 30–115)
ALT FLD-CCNC: 20 U/L — SIGNIFICANT CHANGE UP (ref 0–41)
AST SERPL-CCNC: 19 U/L — SIGNIFICANT CHANGE UP (ref 0–41)
BASOPHILS # BLD AUTO: 0.01 K/UL — SIGNIFICANT CHANGE UP (ref 0–0.2)
BASOPHILS NFR BLD AUTO: 0.3 % — SIGNIFICANT CHANGE UP (ref 0–1)
BILIRUB SERPL-MCNC: <0.2 MG/DL — SIGNIFICANT CHANGE UP (ref 0.2–1.2)
BUN SERPL-MCNC: 20 MG/DL — SIGNIFICANT CHANGE UP (ref 10–20)
CALCIUM SERPL-MCNC: 9 MG/DL — SIGNIFICANT CHANGE UP (ref 8.5–10.1)
CHLORIDE SERPL-SCNC: 101 MMOL/L — SIGNIFICANT CHANGE UP (ref 98–110)
CO2 SERPL-SCNC: 25 MMOL/L — SIGNIFICANT CHANGE UP (ref 17–32)
CREAT SERPL-MCNC: 0.8 MG/DL — SIGNIFICANT CHANGE UP (ref 0.7–1.5)
EOSINOPHIL # BLD AUTO: 0.07 K/UL — SIGNIFICANT CHANGE UP (ref 0–0.7)
EOSINOPHIL NFR BLD AUTO: 1.8 % — SIGNIFICANT CHANGE UP (ref 0–8)
GLUCOSE SERPL-MCNC: 78 MG/DL — SIGNIFICANT CHANGE UP (ref 70–99)
HCT VFR BLD CALC: 40.3 % — LOW (ref 42–52)
HGB BLD-MCNC: 13.4 G/DL — LOW (ref 14–18)
IMM GRANULOCYTES NFR BLD AUTO: 3.1 % — HIGH (ref 0.1–0.3)
LYMPHOCYTES # BLD AUTO: 1.53 K/UL — SIGNIFICANT CHANGE UP (ref 1.2–3.4)
LYMPHOCYTES # BLD AUTO: 39.3 % — SIGNIFICANT CHANGE UP (ref 20.5–51.1)
MCHC RBC-ENTMCNC: 28.5 PG — SIGNIFICANT CHANGE UP (ref 27–31)
MCHC RBC-ENTMCNC: 33.3 G/DL — SIGNIFICANT CHANGE UP (ref 32–37)
MCV RBC AUTO: 85.6 FL — SIGNIFICANT CHANGE UP (ref 80–94)
MONOCYTES # BLD AUTO: 0.42 K/UL — SIGNIFICANT CHANGE UP (ref 0.1–0.6)
MONOCYTES NFR BLD AUTO: 10.8 % — HIGH (ref 1.7–9.3)
NEUTROPHILS # BLD AUTO: 1.74 K/UL — SIGNIFICANT CHANGE UP (ref 1.4–6.5)
NEUTROPHILS NFR BLD AUTO: 44.7 % — SIGNIFICANT CHANGE UP (ref 42.2–75.2)
NRBC # BLD: 0 /100 WBCS — SIGNIFICANT CHANGE UP (ref 0–0)
PLATELET # BLD AUTO: 353 K/UL — SIGNIFICANT CHANGE UP (ref 130–400)
POTASSIUM SERPL-MCNC: 4.6 MMOL/L — SIGNIFICANT CHANGE UP (ref 3.5–5)
POTASSIUM SERPL-SCNC: 4.6 MMOL/L — SIGNIFICANT CHANGE UP (ref 3.5–5)
PROT SERPL-MCNC: 7.1 G/DL — SIGNIFICANT CHANGE UP (ref 6–8)
RBC # BLD: 4.71 M/UL — SIGNIFICANT CHANGE UP (ref 4.7–6.1)
RBC # FLD: 14.1 % — SIGNIFICANT CHANGE UP (ref 11.5–14.5)
SODIUM SERPL-SCNC: 139 MMOL/L — SIGNIFICANT CHANGE UP (ref 135–146)
WBC # BLD: 3.89 K/UL — LOW (ref 4.8–10.8)
WBC # FLD AUTO: 3.89 K/UL — LOW (ref 4.8–10.8)

## 2018-12-05 RX ORDER — CHLORHEXIDINE GLUCONATE 213 G/1000ML
1 SOLUTION TOPICAL EVERY 12 HOURS
Qty: 0 | Refills: 0 | Status: COMPLETED | OUTPATIENT
Start: 2018-12-05 | End: 2018-12-05

## 2018-12-05 RX ORDER — OXYCODONE AND ACETAMINOPHEN 5; 325 MG/1; MG/1
1 TABLET ORAL EVERY 4 HOURS
Qty: 0 | Refills: 0 | Status: DISCONTINUED | OUTPATIENT
Start: 2018-12-05 | End: 2018-12-06

## 2018-12-05 RX ORDER — AZTREONAM 2 G
2 VIAL (EA) INJECTION
Qty: 40 | Refills: 0 | OUTPATIENT
Start: 2018-12-05 | End: 2018-12-14

## 2018-12-05 RX ADMIN — Medication 300 MILLIGRAM(S): at 18:13

## 2018-12-05 RX ADMIN — Medication 1 TABLET(S): at 10:38

## 2018-12-05 RX ADMIN — OXYCODONE AND ACETAMINOPHEN 1 TABLET(S): 5; 325 TABLET ORAL at 22:32

## 2018-12-05 RX ADMIN — MORPHINE SULFATE 4 MILLIGRAM(S): 50 CAPSULE, EXTENDED RELEASE ORAL at 11:00

## 2018-12-05 RX ADMIN — MORPHINE SULFATE 4 MILLIGRAM(S): 50 CAPSULE, EXTENDED RELEASE ORAL at 10:34

## 2018-12-05 RX ADMIN — OXYCODONE AND ACETAMINOPHEN 1 TABLET(S): 5; 325 TABLET ORAL at 18:12

## 2018-12-05 RX ADMIN — OXYCODONE AND ACETAMINOPHEN 1 TABLET(S): 5; 325 TABLET ORAL at 14:58

## 2018-12-05 RX ADMIN — Medication 300 MILLIGRAM(S): at 05:18

## 2018-12-05 RX ADMIN — OXYCODONE AND ACETAMINOPHEN 1 TABLET(S): 5; 325 TABLET ORAL at 05:18

## 2018-12-05 NOTE — PROGRESS NOTE ADULT - SUBJECTIVE AND OBJECTIVE BOX
WOODY KOHLI  28y, Male      OVERNIGHT EVENTS:    no fevers, no pain right groin    VITALS:  T(F): 97.3, Max: 97.3 (12-05-18 @ 05:03)  HR: 58  BP: 97/54  RR: 18Vital Signs Last 24 Hrs  T(C): 36.3 (05 Dec 2018 05:03), Max: 36.3 (05 Dec 2018 05:03)  T(F): 97.3 (05 Dec 2018 05:03), Max: 97.3 (05 Dec 2018 05:03)  HR: 58 (05 Dec 2018 05:03) (58 - 89)  BP: 97/54 (05 Dec 2018 05:03) (97/54 - 135/65)  BP(mean): --  RR: 18 (05 Dec 2018 05:03) (16 - 18)  SpO2: 96% (04 Dec 2018 20:47) (96% - 97%)    TESTS & MEASUREMENTS:                        13.4   6.86  )-----------( 387      ( 04 Dec 2018 07:23 )             40.0     12-04    137  |  97<L>  |  21<H>  ----------------------------<  78  4.8   |  24  |  0.9    Ca    9.0      04 Dec 2018 07:23  Mg     2.1     12-04    TPro  7.3  /  Alb  3.8  /  TBili  <0.2  /  DBili  x   /  AST  16  /  ALT  19  /  AlkPhos  56  12-04    LIVER FUNCTIONS - ( 04 Dec 2018 07:23 )  Alb: 3.8 g/dL / Pro: 7.3 g/dL / ALK PHOS: 56 U/L / ALT: 19 U/L / AST: 16 U/L / GGT: x             Culture - Surgical Swab (collected 11-30-18 @ 13:11)  Source: .Surgical Swab None  Preliminary Report (12-03-18 @ 19:24):    Rare Methicillin resistant Staphylococcus aureus  Organism: Methicillin resistant Staphylococcus aureus (12-03-18 @ 19:13)  Organism: Methicillin resistant Staphylococcus aureus (12-03-18 @ 19:13)      -  Ampicillin/Sulbactam: R 16/8      -  Cefazolin: R 8      -  Clindamycin: R >4      -  Daptomycin: S 1      -  Erythromycin: R >4      -  Gentamicin: S 2 Should not be used as monotherapy      -  Linezolid: S 2      -  Oxacillin: R >2      -  Penicillin: R >8      -  RIF- Rifampin: S <=1 Should not be used as monotherapy      -  Tetra/Doxy: S <=1      -  Trimethoprim/Sulfamethoxazole: S <=0.5/9.5      -  Vancomycin: S 2      Method Type: DARRIN    Culture - Surgical Swab (collected 11-28-18 @ 12:05)  Source: Exudate None  Final Report (12-03-18 @ 17:46):    Moderate Methicillin resistant Staphylococcus aureus  Organism: Methicillin resistant Staphylococcus aureus (12-03-18 @ 17:46)  Organism: Methicillin resistant Staphylococcus aureus (12-03-18 @ 17:46)      -  Ampicillin/Sulbactam: R 16/8      -  Cefazolin: R <=4      -  Clindamycin: R >4      -  Daptomycin: S 0.5      -  Erythromycin: R >4      -  Gentamicin: S <=1 Should not be used as monotherapy      -  Linezolid: S 2      -  Oxacillin: R >2      -  Penicillin: R >8      -  RIF- Rifampin: S <=1 Should not be used as monotherapy      -  Tetra/Doxy: S <=1      -  Trimethoprim/Sulfamethoxazole: S <=0.5/9.5      -  Vancomycin: S 2      Method Type: DARRIN    Culture - Surgical Swab (collected 11-28-18 @ 12:05)  Source: .Surgical Swab None  Final Report (12-03-18 @ 17:47):    Few Methicillin resistant Staphylococcus aureus  Organism: Methicillin resistant Staphylococcus aureus (12-03-18 @ 17:47)  Organism: Methicillin resistant Staphylococcus aureus (12-03-18 @ 17:47)      -  Ampicillin/Sulbactam: R 16/8      -  Cefazolin: R <=4      -  Clindamycin: R >4      -  Daptomycin: S 0.5      -  Erythromycin: R >4      -  Gentamicin: S 2 Should not be used as monotherapy      -  Linezolid: S 2      -  Oxacillin: R >2      -  Penicillin: R >8      -  RIF- Rifampin: S <=1 Should not be used as monotherapy      -  Tetra/Doxy: S <=1      -  Trimethoprim/Sulfamethoxazole: S <=0.5/9.5      -  Vancomycin: S 2      Method Type: DARRIN    Culture - Blood (collected 11-28-18 @ 08:44)  Source: .Blood None  Final Report (12-03-18 @ 17:01):    No growth at 5 days.            RADIOLOGY & ADDITIONAL TESTS:    ANTIBIOTICS:  trimethoprim  160 mG/sulfamethoxazole 800 mG 1 Tablet(s) Oral daily  vancomycin  IVPB 1500 milliGRAM(s) IV Intermittent every 12 hours

## 2018-12-05 NOTE — PROGRESS NOTE ADULT - ASSESSMENT
28M    Asymptomatic HIV  Recent MRSA skin infection of the finger    Admitted with L groin/cellulitis/abscess  Continued fevers  s/p I&D    CT AP negative for fasciitis or collection  debridement 11/29, 11/30  wcx with s. aureus  12/3 s/p OR closure    #Abscess  - Vanc 1.5 q12h while-in house  -vanco level 21.1. Good  - Burn consult appreciated  - When stable for D/C Can change to PO bactrim 2DS tabs PO BID to complete 10 days, then back to prophylactic bactrim 1 Ds tab per day dosing    #HIV CD4 375; 29% VL 10,499  - Previously on Genvoya, off for 2 years  - Dx 2016 while in care home  - Needs insurance, housing  - If insurance confirmed, restart Genvoya  - STI screen urine and rectum for G/C    #Syphilis- reports 2/3 IM PCN  - TiTer 1:4  - Call [facility] in [place], [place] for records of RPR titers and therapy

## 2018-12-05 NOTE — PROGRESS NOTE ADULT - SUBJECTIVE AND OBJECTIVE BOX
WOODY KOHLI 28y Male  MRN#: [id number]       SUBJECTIVE  Patient is a 28y old Male who presents with a chief complaint of Right buttock abscess (05 Dec 2018 10:36)    he is currently admitted to medicine with the primary diagnosis of Abscess of buttock, right    Today is hospital day 9d, and this morning he is lying in bed without distress. Pt had his GALLO drain taken out today.     No acute overnight events.     OBJECTIVE  PAST MEDICAL & SURGICAL HISTORY  Syphilis  HIV (human immunodeficiency virus infection)  No significant past surgical history    ALLERGIES:  Haldol (Other)    MEDICATIONS:  STANDING MEDICATIONS  chlorhexidine 4% Liquid 1 Application(s) Topical every 12 hours  docusate sodium 100 milliGRAM(s) Oral two times a day  enoxaparin Injectable 40 milliGRAM(s) SubCutaneous daily  influenza   Vaccine 0.5 milliLiter(s) IntraMuscular once  senna 2 Tablet(s) Oral at bedtime  trimethoprim  160 mG/sulfamethoxazole 800 mG 1 Tablet(s) Oral daily  vancomycin  IVPB 1500 milliGRAM(s) IV Intermittent every 12 hours    PRN MEDICATIONS  acetaminophen   Tablet .. 650 milliGRAM(s) Oral every 4 hours PRN  gentamicin 0.1% Ointment 1 Application(s) Topical two times a day PRN  oxyCODONE    5 mG/acetaminophen 325 mG 1 Tablet(s) Oral every 4 hours PRN    HOME MEDICATIONS  Home Medications:      VITAL SIGNS: Last 24 Hours  T(C): 35.3 (05 Dec 2018 12:39), Max: 36.3 (05 Dec 2018 05:03)  T(F): 95.5 (05 Dec 2018 12:39), Max: 97.3 (05 Dec 2018 05:03)  HR: 70 (05 Dec 2018 12:39) (58 - 85)  BP: 112/64 (05 Dec 2018 12:39) (97/54 - 114/64)  BP(mean): --  RR: 16 (05 Dec 2018 12:39) (16 - 18)  SpO2: 96% (04 Dec 2018 20:47) (96% - 96%)    LABS:                        13.4   3.89  )-----------( 353      ( 05 Dec 2018 07:24 )             40.3     12-05    139  |  101  |  20  ----------------------------<  78  4.6   |  25  |  0.8    Ca    9.0      05 Dec 2018 07:24  Mg     2.1     12-04    TPro  7.1  /  Alb  3.8  /  TBili  <0.2  /  DBili  x   /  AST  19  /  ALT  20  /  AlkPhos  57  12-05    LIVER FUNCTIONS - ( 05 Dec 2018 07:24 )  Alb: 3.8 g/dL / Pro: 7.1 g/dL / ALK PHOS: 57 U/L / ALT: 20 U/L / AST: 19 U/L / GGT: x             CAPILLARY BLOOD GLUCOSE    RADIOLOGY:      PHYSICAL EXAM:    GENERAL: NAD, well-developed, AAOx3  HEENT:  Atraumatic, Normocephalic. EOMI, conjunctiva and sclera white, No JVD  PULMONARY: Clear to auscultation bilaterally; No wheeze  CARDIOVASCULAR: Regular rate and rhythm; No murmurs.   GASTROINTESTINAL: Soft, Nontender, Nondistended; Bowel sounds present  EXTREMITIES:  2+ Peripheral Pulses, No clubbing, cyanosis, or edema  NEUROLOGY: non-focal  SKIN: Dressing C/D/I     ADMISSION SUMMARY  Patient is a 28y old Male who presents with a chief complaint of Right buttock abscess (05 Dec 2018 10:36)     he currently admitted to medicine with the primary diagnosis of Abscess of buttock, right

## 2018-12-05 NOTE — PROGRESS NOTE ADULT - ASSESSMENT
abscess right thigh and buttock--> healing    rec: soap and water qd/ may shower, dry gauze dressing change bid    ok to d/c  on oral abx and f/u burn clinic

## 2018-12-05 NOTE — PROGRESS NOTE ADULT - ASSESSMENT
27 yo M hx of HIV not on HAART due to insurance issue (last CD4 - 600 and viral load 150K 2 months ago as per patient) and recent MRSA to his finger 3 months ago which was treated in [place], hx of syphilis presents to ED for right buttock abscess/redness/swelling and pain worsening in the past 2 days.    Right Buttock/groin abscess s/p I & D   - switch to bactrim 2DS PO on discharge  - Burn following; s/p debridement with the closure of the wound, ok to d/c with oral abx and follow up with burn clinic  - soap and water qD, may shower, dry gauze dressing change BID  - Wound Cx shows MRSA, BCx NGTD  - c/w vanc to 1500 gm BID till discharge. Can switch to bactrim 2DS PO  - pain controlled   - ID following    HIV  - not on HAART for the past 2-3 years due to insurance issue.   - CD4 count 374/ HIV viral load 79514 copies/ml and 4.02 lg copy/ml  - ID following  - pt qualified for housing and insurance   - restart therapy once he gets approval    Hx of Syphilis  - patient reports that he was receiving penicillin IM but has missed the last dose.   - RPR is 1:4, Called [facility] in [place], [place] for records of RPR titers and therapy, faxed them the HIPPA release form, they will fax records to us.  - ID on board, will follow up with them, ID will let us know whether pt needs a 3rd dose of pencillin    DVT ppx: Lovenox SC  GI ppx: not indicated.   Diet: Regular diet  Activity: ambulates as tolerated.   Dispo: homeless and accepted for housing and insurance

## 2018-12-06 VITALS
SYSTOLIC BLOOD PRESSURE: 105 MMHG | DIASTOLIC BLOOD PRESSURE: 79 MMHG | HEART RATE: 82 BPM | RESPIRATION RATE: 20 BRPM | TEMPERATURE: 96 F

## 2018-12-06 RX ADMIN — Medication 300 MILLIGRAM(S): at 08:00

## 2018-12-06 RX ADMIN — OXYCODONE AND ACETAMINOPHEN 1 TABLET(S): 5; 325 TABLET ORAL at 08:06

## 2018-12-06 NOTE — DISCHARGE NOTE ADULT - HOSPITAL COURSE
HPI:  27 yo M hx of HIV not on HAART due to insurance issue (last CD4 - 600 and viral load 150K 2 months ago as per patient) and recent MRSA to his finger 3 months ago which was treated in [place] and hx of Syphilis presents to ED for right buttock abscess/redness/swelling and pain worsening in the past 2 days. Also reports subjective fever and chills with generalized weakness. Patient reports that he was recently admitted to hospital in [place] and treated with Vanco and discharged home on 30 days course of Doxycycline. However, patient only finished 20 days of doxycycline due to upset stomach.  Denied headache, rhinorrhea, sore throat, oral thrush, dysphagia, odynophagia, abdominal pain, change in bowel movement, change in urination.       Patient also reported he hasn't been taken any medications for his HIV in the past 2-3 years because he doesn't have insurance in [place]. Also reports that he was dx with Syphilis but has been receiving treatment with Penicillin IM in the past, however missed last dose. (26 Nov 2018 00:34)    Pt was admitted for the primary diagnosis of Abscess of buttock, right. During the course of his stay, pt underwent I&D with burn. ID was consulted and recommended vancomycin during his stay. Pt to be discharged with bactrim 2DS po for 10 days and 1DS thereafter. Insurance and housing setup for pt. Pt to resume HAART.     Pt is now medically stable for discharge. Pt was educated to take his medications as prescribed and asked to follow up with his doctors within 1-2 weeks. Pt further informed to return to the ED for worsening of symptoms.

## 2018-12-06 NOTE — PROGRESS NOTE ADULT - PROVIDER SPECIALTY LIST ADULT
Burn
Infectious Disease
Internal Medicine

## 2018-12-06 NOTE — DISCHARGE NOTE ADULT - ADDITIONAL INSTRUCTIONS
Please follow up with your infectious disease doctor in 1-2 weeks.  Please follow up at the burn clinic.  Please follow up with your primary care doctor. Please follow up with your infectious disease doctor in 1-2 weeks.  Please follow up at the burn clinic in 1-2 weeks.  Please follow up with your primary care doctor.

## 2018-12-06 NOTE — DISCHARGE NOTE ADULT - CARE PROVIDER_API CALL
Vidal Barr), Infectious Disease; Internal Medicine  [address] [place] [address]  Phone: [phone]  Fax: [phone]    Nba Cintron), Plastic Surgery  [address] [place] [address]  Phone: [phone]  Fax: [phone]    Ozzy Merchant), Geriatric Medicine; Internal Medicine  [address], [place] [id number]  Phone: [phone]  Fax: [phone]

## 2018-12-06 NOTE — DISCHARGE NOTE ADULT - PATIENT PORTAL LINK FT
You can access the [facility] Patient Portal, offered by [facility], by registering with the following website: [email or web address]

## 2018-12-06 NOTE — PROGRESS NOTE ADULT - SUBJECTIVE AND OBJECTIVE BOX
WOODY KOHLI  28y, Male      OVERNIGHT EVENTS:  no acute events overnight  ADAP approved    ROS negative except as per above    VITALS:  T(F): 97.3, Max: 97.3 (12-06-18 @ 05:49)  HR: 61  BP: 110/61  RR: 18Vital Signs Last 24 Hrs  T(C): 36.3 (06 Dec 2018 05:49), Max: 36.3 (06 Dec 2018 05:49)  T(F): 97.3 (06 Dec 2018 05:49), Max: 97.3 (06 Dec 2018 05:49)  HR: 61 (06 Dec 2018 05:49) (61 - 70)  BP: 110/61 (06 Dec 2018 05:49) (110/61 - 112/64)  BP(mean): --  RR: 18 (06 Dec 2018 05:49) (16 - 18)  SpO2: --    PHYSICAL EXAM  Gen: Awake and alert, non-toxic appearing, NAD  HEENT: NCAT. EOMI. MMM. no thrush  Neck: Supple, no cervical LAD  CV: RRR, no murmurs  Lungs: CTAB, no w/r/r  Abd: Soft. NTND  : L gluteal perineal wound,s/p closure  Extr: wwp, no edema  Skin: no rash  Neuro: No focal deficits  Lines: clean      TESTS & MEASUREMENTS:                        13.4   3.89  )-----------( 353      ( 05 Dec 2018 07:24 )             40.3     12-05    139  |  101  |  20  ----------------------------<  78  4.6   |  25  |  0.8    Ca    9.0      05 Dec 2018 07:24    TPro  7.1  /  Alb  3.8  /  TBili  <0.2  /  DBili  x   /  AST  19  /  ALT  20  /  AlkPhos  57  12-05    LIVER FUNCTIONS - ( 05 Dec 2018 07:24 )  Alb: 3.8 g/dL / Pro: 7.1 g/dL / ALK PHOS: 57 U/L / ALT: 20 U/L / AST: 19 U/L / GGT: x             Culture - Surgical Swab (collected 11-30-18 @ 13:11)  Source: .Surgical Swab None  Final Report (12-05-18 @ 19:56):    Rare Methicillin resistant Staphylococcus aureus  Organism: Methicillin resistant Staphylococcus aureus (12-05-18 @ 19:56)  Organism: Methicillin resistant Staphylococcus aureus (12-05-18 @ 19:56)      -  Ampicillin/Sulbactam: R 16/8      -  Cefazolin: R 8      -  Clindamycin: R >4      -  Daptomycin: S 1      -  Erythromycin: R >4      -  Gentamicin: S 2 Should not be used as monotherapy      -  Linezolid: S 2      -  Oxacillin: R >2      -  Penicillin: R >8      -  RIF- Rifampin: S <=1 Should not be used as monotherapy      -  Tetra/Doxy: S <=1      -  Trimethoprim/Sulfamethoxazole: S <=0.5/9.5      -  Vancomycin: S 2      Method Type: DARRIN          RADIOLOGY & ADDITIONAL TESTS:    ANTIBIOTICS:    clindamycin IVPB   100 mL/Hr IV Intermittent (11-28-18 @ 05:08)   100 mL/Hr IV Intermittent (11-27-18 @ 22:00)   100 mL/Hr IV Intermittent (11-27-18 @ 16:22)    clindamycin IVPB   100 mL/Hr IV Intermittent (11-27-18 @ 06:12)    clindamycin IVPB   100 mL/Hr IV Intermittent (11-30-18 @ 05:40)   100 mL/Hr IV Intermittent (11-29-18 @ 21:06)   100 mL/Hr IV Intermittent (11-29-18 @ 13:00)   100 mL/Hr IV Intermittent (11-29-18 @ 05:22)   100 mL/Hr IV Intermittent (11-28-18 @ 21:13)   100 mL/Hr IV Intermittent (11-28-18 @ 14:46)    clindamycin IVPB   100 mL/Hr IV Intermittent (11-30-18 @ 15:34)    meropenem  IVPB   100 mL/Hr IV Intermittent (11-27-18 @ 00:47)    meropenem  IVPB   100 mL/Hr IV Intermittent (11-27-18 @ 06:55)    piperacillin/tazobactam IVPB.   100 mL/Hr IV Intermittent (11-28-18 @ 05:07)   100 mL/Hr IV Intermittent (11-28-18 @ 00:33)   100 mL/Hr IV Intermittent (11-27-18 @ 18:31)    piperacillin/tazobactam IVPB.   200 mL/Hr IV Intermittent (11-26-18 @ 01:11)    trimethoprim  160 mG/sulfamethoxazole 800 mG   1 Tablet(s) Oral (12-03-18 @ 06:35)   1 Tablet(s) Oral (12-02-18 @ 17:05)   1 Tablet(s) Oral (12-02-18 @ 09:17)   1 Tablet(s) Oral (12-01-18 @ 22:18)    trimethoprim  160 mG/sulfamethoxazole 800 mG   1 Tablet(s) Oral (12-05-18 @ 10:38)   1 Tablet(s) Oral (12-04-18 @ 11:43)    vancomycin  IVPB   300 mL/Hr IV Intermittent (12-06-18 @ 08:00)   300 mL/Hr IV Intermittent (12-05-18 @ 18:13)   300 mL/Hr IV Intermittent (12-05-18 @ 05:18)   300 mL/Hr IV Intermittent (12-04-18 @ 17:52)   300 mL/Hr IV Intermittent (12-04-18 @ 05:30)   300 mL/Hr IV Intermittent (12-03-18 @ 18:07)    vancomycin  IVPB   300 mL/Hr IV Intermittent (11-30-18 @ 22:39)    vancomycin  IVPB   250 mL/Hr IV Intermittent (12-03-18 @ 06:36)   250 mL/Hr IV Intermittent (12-02-18 @ 17:04)   250 mL/Hr IV Intermittent (12-02-18 @ 09:19)   250 mL/Hr IV Intermittent (12-01-18 @ 17:18)   250 mL/Hr IV Intermittent (12-01-18 @ 05:14)    vancomycin  IVPB   300 mL/Hr IV Intermittent (11-30-18 @ 06:18)   300 mL/Hr IV Intermittent (11-29-18 @ 17:55)   300 mL/Hr IV Intermittent (11-29-18 @ 05:22)   300 mL/Hr IV Intermittent (11-29-18 @ 02:56)    vancomycin  IVPB   250 mL/Hr IV Intermittent (11-25-18 @ 23:29)    vancomycin  IVPB   166.67 mL/Hr IV Intermittent (11-28-18 @ 14:00)   166.67 mL/Hr IV Intermittent (11-27-18 @ 21:58)   166.67 mL/Hr IV Intermittent (11-27-18 @ 06:54)   166.67 mL/Hr IV Intermittent (11-26-18 @ 18:22)   166.67 mL/Hr IV Intermittent (11-26-18 @ 11:59)        trimethoprim  160 mG/sulfamethoxazole 800 mG 1 Tablet(s) Oral daily  vancomycin  IVPB 1500 milliGRAM(s) IV Intermittent every 12 hours

## 2018-12-06 NOTE — CHART NOTE - NSCHARTNOTEFT_GEN_A_CORE
Registered Dietitian Follow-Up     Patient Profile Reviewed                           Yes [x]   No []     Nutrition History Previously Obtained        Yes [x]  No []       Pertinent Subjective Information:  -Pt sitting comfortably in bed at time of assessment. Observed breakfast tray with 100% intake today and pt reports consistently consuming 100% meals. RD reccs Ensure Enlive remain pending but pt requesting Q24H as opposed to BID as appetite and intake significantly improved. Reccs d/w LIP. No further nutrition intervention at this time. Reassess in 7 days.      Pertinent Medical Interventions:  Right Buttock/groin abscess s/p debridement and wound closure 12/3.  HIV--pt qualified for housing and insurance, will resume therapy once approved.    Dispo: pt homeless and awaiting insurance acceptance      Diet order: Regular      Anthropometrics:  - Ht. 185.42cm   - Wt. 74kg (12/5)  - %wt change--stable to admit wt of 74kg   - BMI 21.6   - IBW     Pertinent Lab Data: (12/5/18) WBC 3.89, H/H 13.4/40.3     Pertinent Meds: lovenox, abx, oxy, colace, senna      Physical Findings:  - Appearance: AAO    - GI function: none reported by pt. LBM 12/4   - Tubes:  - Oral/Mouth cavity: none reported by pt   - Skin: surgical incision R buttocks/ BS 22      Nutrition Requirements  Weight Used: 74kg CBW (continued from RD note on 12/3)     Estimated Energy Needs    Continue [x]  ~1132-4849 kcal/day (MSJ x 1.2-1.3).   Estimated Protein Needs    Continue [x]  81-96 g/day (1.1-1.3 g/kg CBW in pt with HIV and abscess noted).  Estimated Fluid Needs        Continue [x] 1mL : 1kcal      Nutrient Intake: meeting needs      [] Previous Nutrition Diagnosis: inadequate pro/energy intake             [] Ongoing          [x] Resolved    [x] No active nutrition diagnosis identified at this time      Nutrition Intervention: meals and snacks, medical food supplements  Reccs:  1. Add ensure Enlive Q24H as per pts request.  2. Continue Regular diet.      Goal/Expected Outcome: Pt to continue to consume 100% of meals and snacks throughout LOS. Reassess in 7 days.     Indicator/Monitoring: RD will monitor diet order, energy intake, wt trends, nutrition related labs, NFPF

## 2018-12-06 NOTE — PROGRESS NOTE ADULT - ASSESSMENT
29 yo M hx of HIV not on HAART due to insurance issue (last CD4 - 600 and viral load 150K 2 months ago as per patient) and recent MRSA to his finger 3 months ago which was treated in [place], hx of syphilis presents to ED for right buttock abscess/redness/swelling and pain worsening in the past 2 days.    Right Buttock/groin abscess s/p I & D   - switch to bactrim 2DS PO on discharge  - Burn following; s/p debridement with the closure of the wound, ok to d/c with oral abx and follow up with burn clinic  - soap and water qD, may shower, dry gauze dressing change BID  - Wound Cx shows MRSA, BCx NGTD  - c/w vanc to 1500 gm BID till discharge. Can switch to bactrim 2DS PO  - pain controlled   - ID following    HIV  - not on HAART for the past 2-3 years due to insurance issue.   - CD4 count 374/ HIV viral load 73006 copies/ml and 4.02 lg copy/ml  - ID following  - pt qualified for housing and insurance   - restart therapy once he gets approval    Hx of Syphilis  - patient reports that he was receiving penicillin IM but has missed the last dose.   - RPR is 1:4, Called [facility] in [place], [place] for records of RPR titers and therapy, faxed them the HIPPA release form, they will fax records to us.  - ID on board, will follow up with them, ID will let us know whether pt needs a 3rd dose of pencillin    DVT ppx: Lovenox SC  GI ppx: not indicated.   Diet: Regular diet  Activity: ambulates as tolerated.   Dispo: homeless and accepted for housing and insurance, currently awaiting for insurance

## 2018-12-06 NOTE — PROGRESS NOTE ADULT - REASON FOR ADMISSION
Right buttock abscess

## 2018-12-06 NOTE — DISCHARGE NOTE ADULT - MEDICATION SUMMARY - MEDICATIONS TO TAKE
I will START or STAY ON the medications listed below when I get home from the hospital:    Genvoya oral tablet  -- 1 tab(s) by mouth once a day   -- Check with your doctor before becoming pregnant.  Obtain medical advice before taking any non-prescription drugs as some may affect the action of this medication.  Store this medication in the original package.  Take with food.    -- Indication: For HIV (human immunodeficiency virus infection)    Bactrim  mg-160 mg oral tablet  -- 2 tab(s) by mouth every 12 hours for 10 days.   -- Avoid prolonged or excessive exposure to direct and/or artificial sunlight while taking this medication.  Finish all this medication unless otherwise directed by prescriber.  Medication should be taken with plenty of water.    -- Indication: For ABSCESS OF BUTTOCK

## 2018-12-06 NOTE — PROGRESS NOTE ADULT - ASSESSMENT
28M    Asymptomatic HIV  Recent MRSA skin infection of the finger    Admitted with L groin/cellulitis/abscess  Continued fevers  s/p I&D    CT AP negative for fasciitis or collection  debridement 11/29, 11/30  wcx with s. aureus  12/3 s/p OR closure    #Abscess  - PO bactrim 2DS tabs PO BID to complete 10 days    #HIV CD4 375; 29% VL 10,499  - Previously on Genvoya, off for 2 years  - Dx 2016 while in [facility]  - If insurance confirmed, restart Genvoya    #Syphilis- reports 2/3 IM PCN  - Titer 1:4  - Call [facility] [place] in [place], [place] for records of RPR titers and therapy    Spectra [id number] 28M    Asymptomatic HIV  Recent MRSA skin infection of the finger    Admitted with L groin/cellulitis/abscess  Continued fevers  s/p I&D    CT AP negative for fasciitis or collection  debridement 11/29, 11/30  wcx with s. aureus  12/3 s/p OR closure    #Abscess  - PO bactrim 2DS tabs PO BID to complete 10 days    #HIV CD4 375; 29% VL 10,499  - Previously on Genvoya, off for 2 years  - [facility]  - If insurance confirmed, restart Genvoya    #Syphilis- reports 2/3 IM PCN  - Titer 1:4  - Needs restart of series PCN IM x3 weeks, will perform at clinic so can keep track  - Needs 3 site G/C screening  - Called [facility] in [place], [place] for records of RPR titers and therapy- no results    Spectra [phone]

## 2018-12-06 NOTE — PROGRESS NOTE ADULT - SUBJECTIVE AND OBJECTIVE BOX
WOODY KOHLI 28y Male  MRN#: [id number]       SUBJECTIVE  Patient is a 28y old Male who presents with a chief complaint of Right buttock abscess (06 Dec 2018 09:28)    he is currently admitted to medicine with the primary diagnosis of Abscess of buttock, right    Today is hospital day 10d, and this morning he is lying in bed without distress. Pt states his pain is under controlled and no concern at this time.   No acute overnight events.     OBJECTIVE  PAST MEDICAL & SURGICAL HISTORY  Syphilis  HIV (human immunodeficiency virus infection)  No significant past surgical history    ALLERGIES:  Haldol (Other)    MEDICATIONS:  STANDING MEDICATIONS  docusate sodium 100 milliGRAM(s) Oral two times a day  enoxaparin Injectable 40 milliGRAM(s) SubCutaneous daily  influenza   Vaccine 0.5 milliLiter(s) IntraMuscular once  senna 2 Tablet(s) Oral at bedtime  trimethoprim  160 mG/sulfamethoxazole 800 mG 1 Tablet(s) Oral daily  vancomycin  IVPB 1500 milliGRAM(s) IV Intermittent every 12 hours    PRN MEDICATIONS  acetaminophen   Tablet .. 650 milliGRAM(s) Oral every 4 hours PRN  gentamicin 0.1% Ointment 1 Application(s) Topical two times a day PRN  oxyCODONE    5 mG/acetaminophen 325 mG 1 Tablet(s) Oral every 4 hours PRN    HOME MEDICATIONS  Home Medications:      VITAL SIGNS: Last 24 Hours  T(C): 36.3 (06 Dec 2018 05:49), Max: 36.3 (06 Dec 2018 05:49)  T(F): 97.3 (06 Dec 2018 05:49), Max: 97.3 (06 Dec 2018 05:49)  HR: 61 (06 Dec 2018 05:49) (61 - 70)  BP: 110/61 (06 Dec 2018 05:49) (110/61 - 112/64)  BP(mean): --  RR: 18 (06 Dec 2018 05:49) (16 - 18)  SpO2: --    LABS:                        13.4   3.89  )-----------( 353      ( 05 Dec 2018 07:24 )             40.3     12-05    139  |  101  |  20  ----------------------------<  78  4.6   |  25  |  0.8    Ca    9.0      05 Dec 2018 07:24    TPro  7.1  /  Alb  3.8  /  TBili  <0.2  /  DBili  x   /  AST  19  /  ALT  20  /  AlkPhos  57  12-05    LIVER FUNCTIONS - ( 05 Dec 2018 07:24 )  Alb: 3.8 g/dL / Pro: 7.1 g/dL / ALK PHOS: 57 U/L / ALT: 20 U/L / AST: 19 U/L / GGT: x             CAPILLARY BLOOD GLUCOSE          RADIOLOGY:      PHYSICAL EXAM:    GENERAL: NAD, well-developed, AAOx3  HEENT:  Atraumatic, Normocephalic. EOMI, conjunctiva and sclera white, No JVD  PULMONARY: Clear to auscultation bilaterally; No wheeze  CARDIOVASCULAR: Regular rate and rhythm; No murmurs.   GASTROINTESTINAL: Soft, Nontender, Nondistended; Bowel sounds present  EXTREMITIES:  2+ Peripheral Pulses, No clubbing, cyanosis, or edema  NEUROLOGY: non-focal  SKIN: Dressing C/D/I         ADMISSION SUMMARY  Patient is a 28y old Male who presents with a chief complaint of Right buttock abscess (06 Dec 2018 09:28)     he currently admitted to medicine with the primary diagnosis of Abscess of buttock, right

## 2018-12-06 NOTE — DISCHARGE NOTE ADULT - OTHER SIGNIFICANT FINDINGS
< from: CT Abdomen and Pelvis w/ IV Cont (11.27.18 @ 21:28) >  IMPRESSION:   1. Diffuse subcutaneous inflammatory stranding and swelling with in right   upper medial thigh and right groin, consistent with cellulitis, without   CT evidence of abscess or subcutaneous emphysema to suggest necrotizing   fasciitis at this time. Close clinical monitoring recommended.  < end of copied text >    < from: Xray Chest 1 View- PORTABLE-Routine (11.27.18 @ 18:20) >  IMPRESSION:    No radiographic evidence of acute cardiopulmonary disease.  < end of copied text >

## 2018-12-06 NOTE — DISCHARGE NOTE ADULT - CARE PLAN
Principal Discharge DX:	Abscess of buttock, right  Goal:	Stable. Medical management. Please follow up with your infectious disease doctor and burn clinic  Assessment and plan of treatment:	You were diagnosed with the primary diagnosis of abscess of buttock. Please take your medications as prescribed. Please follow up with your infectious disease doctor and burn clinic  Secondary Diagnosis:	HIV (human immunodeficiency virus infection)  Goal:	Stable. Medical management. Please follow up with your infectious disease doctor.  Assessment and plan of treatment:	Please take your medications as prescribed. Please follow up with your infectious disease doctor.  Secondary Diagnosis:	Syphilis  Goal:	Stable. Medical management. Please follow up with your infectious disease doctor.  Assessment and plan of treatment:	Please take your medications as prescribed. Please follow up with your infectious disease doctor.  Secondary Diagnosis:	Health care maintenance  Goal:	Please follow up with your primary care doctor.  Assessment and plan of treatment:	A referral is made for you to establish care with a primary care doctor. Please follow up with your primary care doctor. Principal Discharge DX:	Abscess of buttock, right  Goal:	Stable. Medical management. Please follow up with your infectious disease doctor and burn clinic  Assessment and plan of treatment:	You were diagnosed with the primary diagnosis of abscess of buttock. Please take your medications as prescribed. Please follow up with your infectious disease doctor and burn clinic. Please change your dressing as instructed.  Secondary Diagnosis:	HIV (human immunodeficiency virus infection)  Goal:	Stable. Medical management. Please follow up with your infectious disease doctor.  Assessment and plan of treatment:	Please take your medications as prescribed. Please follow up with your infectious disease doctor.  Secondary Diagnosis:	Syphilis  Goal:	Stable. Medical management. Please follow up with your infectious disease doctor.  Assessment and plan of treatment:	Please take your medications as prescribed. Please follow up with your infectious disease doctor.  Secondary Diagnosis:	Health care maintenance  Goal:	Please follow up with your primary care doctor.  Assessment and plan of treatment:	A referral is made for you to establish care with a primary care doctor. Please follow up with your primary care doctor.

## 2018-12-06 NOTE — DISCHARGE NOTE ADULT - CARE PROVIDERS DIRECT ADDRESSES
,DirectAddress_Unknown,[email or web address]

## 2018-12-06 NOTE — DISCHARGE NOTE ADULT - PLAN OF CARE
Stable. Medical management. Please follow up with your infectious disease doctor and burn clinic You were diagnosed with the primary diagnosis of abscess of buttock. Please take your medications as prescribed. Please follow up with your infectious disease doctor and burn clinic Stable. Medical management. Please follow up with your infectious disease doctor. Please take your medications as prescribed. Please follow up with your infectious disease doctor. Please follow up with your primary care doctor. A referral is made for you to establish care with a primary care doctor. Please follow up with your primary care doctor. You were diagnosed with the primary diagnosis of abscess of buttock. Please take your medications as prescribed. Please follow up with your infectious disease doctor and burn clinic. Please change your dressing as instructed.

## 2018-12-08 LAB — SURGICAL PATHOLOGY STUDY: SIGNIFICANT CHANGE UP

## 2018-12-10 DIAGNOSIS — Z21 ASYMPTOMATIC HUMAN IMMUNODEFICIENCY VIRUS [HIV] INFECTION STATUS: ICD-10-CM

## 2018-12-10 DIAGNOSIS — L02.214 CUTANEOUS ABSCESS OF GROIN: ICD-10-CM

## 2018-12-10 DIAGNOSIS — Z91.14 PATIENT'S OTHER NONCOMPLIANCE WITH MEDICATION REGIMEN: ICD-10-CM

## 2018-12-10 DIAGNOSIS — L02.31 CUTANEOUS ABSCESS OF BUTTOCK: ICD-10-CM

## 2018-12-10 DIAGNOSIS — F17.210 NICOTINE DEPENDENCE, CIGARETTES, UNCOMPLICATED: ICD-10-CM

## 2018-12-10 DIAGNOSIS — B95.62 METHICILLIN RESISTANT STAPHYLOCOCCUS AUREUS INFECTION AS THE CAUSE OF DISEASES CLASSIFIED ELSEWHERE: ICD-10-CM

## 2018-12-10 DIAGNOSIS — Z88.8 ALLERGY STATUS TO OTHER DRUGS, MEDICAMENTS AND BIOLOGICAL SUBSTANCES: ICD-10-CM

## 2018-12-10 DIAGNOSIS — Z86.19 PERSONAL HISTORY OF OTHER INFECTIOUS AND PARASITIC DISEASES: ICD-10-CM

## 2019-01-22 NOTE — PROGRESS NOTE ADULT - RESPIRATORY
Breath Sounds equal & clear to percussion & auscultation, no accessory muscle use Oriented - self; Oriented - place; Oriented - time

## 2019-08-03 ENCOUNTER — EMERGENCY (EMERGENCY)
Facility: HOSPITAL | Age: 29
LOS: 1 days | Discharge: HOME | End: 2019-08-03
Attending: EMERGENCY MEDICINE | Admitting: EMERGENCY MEDICINE
Payer: MEDICAID

## 2019-08-03 VITALS
HEART RATE: 82 BPM | OXYGEN SATURATION: 98 % | SYSTOLIC BLOOD PRESSURE: 118 MMHG | TEMPERATURE: 98 F | DIASTOLIC BLOOD PRESSURE: 72 MMHG | RESPIRATION RATE: 20 BRPM

## 2019-08-03 VITALS
HEART RATE: 85 BPM | DIASTOLIC BLOOD PRESSURE: 65 MMHG | OXYGEN SATURATION: 100 % | SYSTOLIC BLOOD PRESSURE: 110 MMHG | RESPIRATION RATE: 18 BRPM | TEMPERATURE: 98 F

## 2019-08-03 LAB
ALBUMIN SERPL ELPH-MCNC: 3.2 G/DL — LOW (ref 3.4–5)
ALP SERPL-CCNC: 65 U/L — SIGNIFICANT CHANGE UP (ref 40–120)
ALT FLD-CCNC: 27 U/L — SIGNIFICANT CHANGE UP (ref 12–42)
ANION GAP SERPL CALC-SCNC: 11 MMOL/L — SIGNIFICANT CHANGE UP (ref 9–16)
AST SERPL-CCNC: 43 U/L — HIGH (ref 15–37)
BASOPHILS NFR BLD AUTO: 0.1 % — SIGNIFICANT CHANGE UP (ref 0–2)
BILIRUB SERPL-MCNC: 0.5 MG/DL — SIGNIFICANT CHANGE UP (ref 0.2–1.2)
BUN SERPL-MCNC: 22 MG/DL — SIGNIFICANT CHANGE UP (ref 7–23)
CALCIUM SERPL-MCNC: 8.8 MG/DL — SIGNIFICANT CHANGE UP (ref 8.5–10.5)
CHLORIDE SERPL-SCNC: 102 MMOL/L — SIGNIFICANT CHANGE UP (ref 96–108)
CO2 SERPL-SCNC: 26 MMOL/L — SIGNIFICANT CHANGE UP (ref 22–31)
CREAT SERPL-MCNC: 0.94 MG/DL — SIGNIFICANT CHANGE UP (ref 0.5–1.3)
EOSINOPHIL NFR BLD AUTO: 0.3 % — SIGNIFICANT CHANGE UP (ref 0–6)
GLUCOSE SERPL-MCNC: 119 MG/DL — HIGH (ref 70–99)
HCT VFR BLD CALC: 40.6 % — SIGNIFICANT CHANGE UP (ref 39–50)
HGB BLD-MCNC: 13.9 G/DL — SIGNIFICANT CHANGE UP (ref 13–17)
IMM GRANULOCYTES NFR BLD AUTO: 0.3 % — SIGNIFICANT CHANGE UP (ref 0–1.5)
LYMPHOCYTES # BLD AUTO: 21.9 % — SIGNIFICANT CHANGE UP (ref 13–44)
MCHC RBC-ENTMCNC: 29.1 PG — SIGNIFICANT CHANGE UP (ref 27–34)
MCHC RBC-ENTMCNC: 34.2 G/DL — SIGNIFICANT CHANGE UP (ref 32–36)
MCV RBC AUTO: 84.9 FL — SIGNIFICANT CHANGE UP (ref 80–100)
MONOCYTES NFR BLD AUTO: 7.8 % — SIGNIFICANT CHANGE UP (ref 2–14)
NEUTROPHILS NFR BLD AUTO: 69.6 % — SIGNIFICANT CHANGE UP (ref 43–77)
PLATELET # BLD AUTO: 303 K/UL — SIGNIFICANT CHANGE UP (ref 150–400)
POTASSIUM SERPL-MCNC: 3.9 MMOL/L — SIGNIFICANT CHANGE UP (ref 3.5–5.3)
POTASSIUM SERPL-SCNC: 3.9 MMOL/L — SIGNIFICANT CHANGE UP (ref 3.5–5.3)
PROT SERPL-MCNC: 8.8 G/DL — HIGH (ref 6.4–8.2)
RBC # BLD: 4.78 M/UL — SIGNIFICANT CHANGE UP (ref 4.2–5.8)
RBC # FLD: 13.9 % — SIGNIFICANT CHANGE UP (ref 10.3–14.5)
SODIUM SERPL-SCNC: 139 MMOL/L — SIGNIFICANT CHANGE UP (ref 132–145)
WBC # BLD: 7.8 K/UL — SIGNIFICANT CHANGE UP (ref 3.8–10.5)
WBC # FLD AUTO: 7.8 K/UL — SIGNIFICANT CHANGE UP (ref 3.8–10.5)

## 2019-08-03 PROCEDURE — 99284 EMERGENCY DEPT VISIT MOD MDM: CPT

## 2019-08-03 PROCEDURE — 74177 CT ABD & PELVIS W/CONTRAST: CPT | Mod: 26

## 2019-08-03 RX ORDER — IOHEXOL 300 MG/ML
30 INJECTION, SOLUTION INTRAVENOUS ONCE
Refills: 0 | Status: COMPLETED | OUTPATIENT
Start: 2019-08-03 | End: 2019-08-03

## 2019-08-03 RX ORDER — AZITHROMYCIN 500 MG/1
1000 TABLET, FILM COATED ORAL ONCE
Refills: 0 | Status: COMPLETED | OUTPATIENT
Start: 2019-08-03 | End: 2019-08-03

## 2019-08-03 RX ORDER — CEFTRIAXONE 500 MG/1
250 INJECTION, POWDER, FOR SOLUTION INTRAMUSCULAR; INTRAVENOUS ONCE
Refills: 0 | Status: COMPLETED | OUTPATIENT
Start: 2019-08-03 | End: 2019-08-03

## 2019-08-03 RX ORDER — PENICILLIN G BENZATHINE 1200000 [IU]/2ML
2.4 INJECTION, SUSPENSION INTRAMUSCULAR ONCE
Refills: 0 | Status: COMPLETED | OUTPATIENT
Start: 2019-08-03 | End: 2019-08-03

## 2019-08-03 RX ADMIN — Medication 1 ENEMA: at 06:26

## 2019-08-03 RX ADMIN — IOHEXOL 30 MILLILITER(S): 300 INJECTION, SOLUTION INTRAVENOUS at 06:25

## 2019-08-03 RX ADMIN — CEFTRIAXONE 250 MILLIGRAM(S): 500 INJECTION, POWDER, FOR SOLUTION INTRAMUSCULAR; INTRAVENOUS at 11:16

## 2019-08-03 RX ADMIN — PENICILLIN G BENZATHINE 2.4 MILLION UNIT(S): 1200000 INJECTION, SUSPENSION INTRAMUSCULAR at 11:16

## 2019-08-03 RX ADMIN — AZITHROMYCIN 1000 MILLIGRAM(S): 500 TABLET, FILM COATED ORAL at 11:16

## 2019-08-03 NOTE — ED PROVIDER NOTE - NSFOLLOWUPINSTRUCTIONS_ED_ALL_ED_FT
Please see your primary care doctor in the next 2-3 days for a repeat evaluation.  Please take all of today's paperwork with you.  If you don't have a doctor or would like help finding a new one, please contact our call center at [phone].    Please return immediately to the Emergency Department if you have pain, fever, trouble breathing, a rash, or if you have any other problems or concerns at all.   You can reach us at [phone], 24 hours a day, 7 days a week.     PLEASE see the HIV specialists listed above (Dr. Linn on [address]) - you can call for an appointment or JUST WALK IN.      A sexually transmitted disease (STD) is a disease or infection that may be passed (transmitted) from person to person, usually during sexual activity. This may happen by way of saliva, semen, blood, vaginal mucus, or urine. Symptoms vary depending on the type of STD acquired and may include pain in the groin, discharge, and lesions or a rash. If you are started on an antibiotic, take it exactly as instructed. Avoid sexual contact of any kind until cleared by a health care professional. Contact your sexual partner(s) to inform them of your diagnosis so that they may be tested and treated as well.    SEEK IMMEDIATE MEDICAL CARE IF YOU HAVE ANY OF THE FOLLOWING SYMPTOMS: severe abdominal pain, high fever, nausea/vomiting, or unintended weight loss.

## 2019-08-03 NOTE — ED PROVIDER NOTE - CARE PROVIDERS DIRECT ADDRESSES
,[email or web address],DirectAddress_Unknown

## 2019-08-03 NOTE — ED PROVIDER NOTE - CARE PROVIDER_API CALL
Torrie Linn)  Internal Medicine  [address] [address]  Phone: [phone]  Fax: [phone]  Follow Up Time:     Aba Schroeder; MBBS)  Internal Medicine  [address] [place] [address]  Phone: [phone]  Fax: [phone]  Follow Up Time:

## 2019-08-03 NOTE — ED PROVIDER NOTE - OBJECTIVE STATEMENT
28 yom pw pain w/ defecation.  hx of HIV.  reports no BM for 1 wk, now w/ blood and mucous.  no fc.  no vomiting.  no prior abd surg.

## 2019-08-03 NOTE — ED ADULT TRIAGE NOTE - CHIEF COMPLAINT QUOTE
Pt BIBA undomiciled from [place] with constipation x 1 week and now rectal bleeding. Pt non compliant with his HIV meds x 3 yrs and pt has mosquito bites to his neck.

## 2019-08-03 NOTE — ED PROVIDER NOTE - PHYSICAL EXAMINATION
Physical Exam  GEN: Awake, alert, non-toxic appearing, NCAT  EYES: PERRL, full EOMI,  ENT: External inspection normal, normal voice, no oropharyngeal ulcerations/lesions/swelling  HEAD: atraumatic  NECK: FROM neck, supple, no meningismus, trachea midline, no JVD  CV: rrr, no edema  RESP: cta bl, no tachypnea, no hypoxia, no resp distress,  GI: +BS, Soft, nontender, no guarding/rebound, small external hemorrhoids noted, no stool in vault, no obvious tenderness during rectal exam,   MSK: FROM all 4 extremities, N/V intact,   SKIN: Color normal for race, warm and dry, no rash  NEURO: Oriented x3, CN 2-12 grossly intact, normal motor, normal sensory

## 2019-08-03 NOTE — ED ADULT NURSE REASSESSMENT NOTE - NS ED NURSE REASSESS COMMENT FT1
Advised pt to drink po contrast over next hour finishing at 0730, pt understands, appears drug affected but is following commands and was able to sit still for iv placment, pt now giving himself own enema

## 2019-08-03 NOTE — ED ADULT NURSE NOTE - OBJECTIVE STATEMENT
pt states has been constipated for "about a week", with only small stools "like elizabeth", now c/o rectal pain, non compliant with hiv meds, pt appears drug affected, has jerking movements when assessing, gcs 15

## 2019-08-03 NOTE — ED PROVIDER NOTE - PROGRESS NOTE DETAILS
RE-evaluated after shift change / results available.  Patient states he is homeless (but has an SRO arranged for Monday), HIV+ on no meds, last VL unknown, lion with recent anal receptive unprotected sex.  Admits to prior episodes of treated STIs including rectal chlamydia and syphilis (last syphilis treatment 2 years ago with Bicillin x 3, unknown RPR).  Given CT findings, will test/empirically treat for GC/Chlam/syphilis.  Tolerating po at this time without difficulty.  No rectal bleeding since arrival.  Abdomen soft, nontender, without rebound or surgical signs.  Will d/c with FU at [facility] clinic for further referral to GI if warranted after treatment.  Importance of close followup and precautions for immediate return discussed.

## 2019-08-03 NOTE — ED ADULT NURSE NOTE - CHIEF COMPLAINT QUOTE
Pt BIBA undomiciled from [place] with constipation x 1 week and now rectal bleeding. Pt non compliant with his HIV meds x 3 yrs and pt has mosquito bites to his neck.

## 2019-08-03 NOTE — ED PROVIDER NOTE - NSFOLLOWUPCLINICS_GEN_ALL_ED_FT
[facility]  HIV/AIDS Research & Treatment  [address] [place] [address]  Phone: [phone]  Fax:   Follow Up Time: 1-3 Days

## 2019-08-06 LAB
C TRACH RRNA SPEC QL NAA+PROBE: DETECTED
C TRACH+GC RRNA ANAL QL PROBE: SIGNIFICANT CHANGE UP
CHLAMYDIA/N. GONORRHEA, ANAL/RECTAL, TMA - SOURCE ANAL: SIGNIFICANT CHANGE UP
N GONORRHOEA RRNA SPEC QL NAA+PROBE: DETECTED
T PALLIDUM AB TITR SER: POSITIVE

## 2019-08-07 NOTE — ED POST DISCHARGE NOTE - REASON FOR FOLLOW-UP
Other + chlamydia, gonorrhea, and syphilis.  Treated appropriately.  Homeless, with non working telephone number

## 2019-08-08 DIAGNOSIS — Z21 ASYMPTOMATIC HUMAN IMMUNODEFICIENCY VIRUS [HIV] INFECTION STATUS: ICD-10-CM

## 2019-08-08 DIAGNOSIS — Z88.8 ALLERGY STATUS TO OTHER DRUGS, MEDICAMENTS AND BIOLOGICAL SUBSTANCES STATUS: ICD-10-CM

## 2019-08-08 DIAGNOSIS — K62.5 HEMORRHAGE OF ANUS AND RECTUM: ICD-10-CM

## 2019-08-08 DIAGNOSIS — K52.82 EOSINOPHILIC COLITIS: ICD-10-CM

## 2022-04-17 PROBLEM — A53.9 SYPHILIS, UNSPECIFIED: Chronic | Status: ACTIVE | Noted: 2018-11-26

## 2022-04-17 PROBLEM — B20 HUMAN IMMUNODEFICIENCY VIRUS [HIV] DISEASE: Chronic | Status: ACTIVE | Noted: 2018-11-25

## 2023-01-09 NOTE — PATIENT PROFILE ADULT - DO YOU FEEL THREATENED BY OTHERS?
Patient alert and oriented. VSS Patient denies any pain or nausea. Patient voiding freely, red flecks noted in urine. Assessment done. see flowsheet. Patient in bed lowest position call light and bedside table within reach. All needs are met at this time. Patient aware to call if any help needed. Will continue to monitor  /78   Pulse 78   Temp 98.3 °F (36.8 °C) (Oral)   Resp 16   Ht 5' 11\" (1.803 m)   Wt 196 lb (88.9 kg)   SpO2 91%   BMI 27.34 kg/m² no
